# Patient Record
Sex: FEMALE | Race: WHITE | Employment: OTHER | URBAN - METROPOLITAN AREA
[De-identification: names, ages, dates, MRNs, and addresses within clinical notes are randomized per-mention and may not be internally consistent; named-entity substitution may affect disease eponyms.]

---

## 2017-11-16 ENCOUNTER — HOSPITAL ENCOUNTER (OUTPATIENT)
Dept: PHYSICAL THERAPY | Age: 47
Discharge: HOME | End: 2017-11-16
Payer: OTHER GOVERNMENT

## 2017-11-16 PROCEDURE — 97530 THERAPEUTIC ACTIVITIES: CPT

## 2017-11-16 PROCEDURE — 97162 PT EVAL MOD COMPLEX 30 MIN: CPT

## 2017-11-16 NOTE — PROGRESS NOTES
PF EVALUATION/TREATMENT NOTE     Patient Name: See Gomez  Date:2017  : 1970  [x]  Patient  Verified  Payor:  / Plan: [facility]  RETIREES AND DEPENDENTS / Product Type:  /    In time: 02:10 Out time:03:00  Total Treatment Time (min): 50  Visit #: 4 of     Treatment Area: [x] Pelvic Floor     [] Other:    SUBJECTIVE  Any medication changes, allergies to medications, adverse drug reactions, diagnosis change, or new procedure performed?: [x] No    [] Yes (see summary sheet for update)  SEE POC    Pain Level : 3/10  OBJECTIVE     Pelvic Floor Dysfunction Evaluation    Musculoskeletal Screen: N/A    Skin Integrity:  [x] Healthy [] Red  [] Labia Atrophy [] Fragile    Sensation: [x] Intact [] Diminished:    Muscle Bulk: [x] Symmetrical  [] Well-developed [] Atrophied:  []L   []R   []B    Prolapse: Uterine prolapse: grade II [] Cystocele:   [] Rectocele:     PERF Score (Performance/Endurance/Repetitions/Flicks)   P: 2 E:3 R: 3 F: -- Total: 8    Accessory Muscle Use: adductors    Patient has failed previous pelvic floor muscle training? [] Yes    [x] No    40 min []Eval                  []Re-Eval     10 min Therapeutic Activity:  []  See flow sheet :    []  Increase Tissue extensibility        []  Assess fiber intake    [x]  Assess voiding habits  []  Assess bowel habits  [x]  Other: reviewed bladder diary and proper way to fill out as well as assisted with assessment of functional limitations/quality of life with current symptoms using survey   Rationale: increase awareness  to improve the patients ability to have decreased leakage episodes    Other Objective/Functional Measures: FOTO: PFDI 13    Pain Level (0-10 scale) post treatment: 3/10    ASSESSMENT:   Patient is a 50year old female who presents with symptoms of pelvic/perineal pain and uterine prolapse. Patient reports pain began in  which exacerbated 2 months ago with sudden increase in physical activity.  Pain described as burning/aching sensation mostly on left side of pelvic floor muscles and reaching up towards back. Also, patient reports pain on coccyx. 3/10 pain today with worst 6/10 and least amount of 1/10. Laying flat on her stomach or side feels better. She does have a cushion to relieve pressure. Patient reports she does have urge incontinence in which she wears a pantyliner 2X/day and leakage episodes of 3X/week. Voiding frequency of 5X/day and no nocturia. Grade II with uterine prolapse. No pessary being used at this time. No reports of bowel issues. Examination showed patient had limited  PFM motility, PFM MMT of 2/5, poor PFM endurance and coordination, and increased pain left side>right with PFM especially nearing coccyx. Patient would benefit from skilled PT services to address deficits and improve patient's functional mobility.       [x]  See Plan of Care  []  See progress note/recertification  []  See Discharge Summary         Progress towards goals / Updated goals:  SEE POC    PLAN  []  Upgrade activities as tolerated     [x]  Continue plan of care  []  Update interventions per flow sheet       []  Discharge due to:_  []  Other:_      Fe Zambrano 11/16/2017  2:05 PM

## 2017-11-16 NOTE — PROGRESS NOTES
[facility] at [address] [place] [address]  Phone: [phone]   Fax: [phone]      Plan of Care/ Statement of Necessity for Physical Therapy Services    Patient name: Samantha Houston Start of Care: 2017   Referral source: Stephani Nicholson MD : 1970    Medical Diagnosis: Pelvic and perineal pain [R10.2]  Other muscle spasm [M62.838]   Onset Date:    Treatment Diagnosis: pelvic/perineal pain   Prior Hospitalization: see medical history Provider#: [id number]   Medications: Verified on Patient summary List    Comorbidities: fractures coccyx, SI joint pain, endometriosis, urge incontinence, hemorrhoidectomy 5 years ago, uterine prolapse   Prior Level of Function: patient was independent with ADLs and activities          The Plan of Care and following information is based on the information from the initial evaluation. Assessment/ key information:   Patient is a 50year old female who presents with symptoms of pelvic/perineal pain and uterine prolapse. Patient reports pain began in  which exacerbated 2 months ago with sudden increase in physical activity. Pain described as burning/aching sensation mostly on left side of pelvic floor muscles and reaching up towards back. Also, patient reports pain on coccyx. 3/10 pain today with worst 6/10 and least amount of 1/10. Laying flat on her stomach or side feels better. She does have a cushion to relieve pressure. Patient reports she does have urge incontinence in which she wears a pantyliner 2X/day and leakage episodes of 3X/week. Voiding frequency of 5X/day and no nocturia. Grade II with uterine prolapse. No pessary being used at this time. No reports of bowel issues. Examination showed patient had limited  PFM motility, PFM MMT of 2/5, poor PFM endurance and coordination, and increased pain left side>right with PFM especially nearing coccyx.  Patient would benefit from skilled PT services to address deficits and improve patient's functional mobility. Evaluation Complexity History MEDIUM  Complexity : 1-2 comorbidities / personal factors will impact the outcome/ POC ; Examination MEDIUM Complexity : 3 Standardized tests and measures addressing body structure, function, activity limitation and / or participation in recreation  ;Presentation MEDIUM Complexity : Evolving with changing characteristics  ; Clinical Decision Making MEDIUM Complexity : FOTO score of 26-74  Overall Complexity Rating: MEDIUM    Problem List: Pelvic pain/dysfunction, Decreased pelvic floor mm awareness, Decreased pelvic floor mm strength, Use of accessory muscles, Improper voiding habits, Hypertonus of pelvic floor and Urinary urgency    Treatment Plan may include any combination of the following:   Therapeutic exercise, Urge suppression techniques, Neuromuscular re-education, Manual therapy, Physical agent/modality and Patient educationSEMG/biofeedback, and electrical stimulation  Patient / Family readiness to learn indicated by: asking questions, trying to perform skills and interest    Persons(s) to be included in education: patient (P)    Barriers to Learning/Limitations: None    Patient Goal (s): lessens    Patient Self Reported Health Status: good    Rehabilitation Potential: good    Short Term Goals: To be accomplished in 4  weeks:   1. Pt will demonstrate home exercise program accurately/including isolated PFM contractions as adjunct to PT clinic visits to promote healthy lifestyle and increased quality of life. Status @ Eval: to be initiated when appropriate  2. Pt will have reduced symptoms of POP (perineal heaviness/pressure) by 25-50% by pt report for improved function and increased quality of life. Status @ Eval: 0%  3. Pt will report pain decreased by 50% or greater for increased quality of life. Status @ Eval: 0%  4.  Patient will complete bladder diary as educational tool to assess possible factors with urinary leakage and improve symptoms to decrease need for panty liners. Status @ Eval: provided    Long Term Goals: To be accomplished in 8  weeks:  1. Pt will demonstrate increased PFM motor performance by 1/2-1 grade for more normal function. Status @ Eval: 2/5 PFM MMT left side  2. Pt will report pain 0/10 for improved quality of life. Status @ Eval: 5/10 today, 6/10 at worst  3. Pt will demonstrate ability to manage changes in intrabdominal pressure with appropriate pelvic floor activation in order to decrease sensation of heaviness and improve function. Status @ Eval: poor demonstration noted with breathing  4. Pt will have FOTO score change of 5 points indicating improvement in function. Status @ Eval: 13 PFDI Pain  5. Patient will report decreased leakage episodes to 1X/week at most to be able to improve overall quality of life and decrease need for panty liners. Status @ Eval: 3X/week     Frequency / Duration: Patient to be seen 1-2 times per week for 8 weeks. Patient/ Caregiver education and instruction: Diagnosis, prognosis, Other bladder diary   [x]  Plan of care has been reviewed with MAR Lynch 11/16/2017 2:05 PM    ________________________________________________________________________    I certify that the above Therapy Services are being furnished while the patient is under my care. I agree with the treatment plan and certify that this therapy is necessary.     Physician's Signature:___________________  Date:____________Time: _________    Please sign and return to [facility] at [address] [place] [address]     Phone: [phone]   Fax: [phone]

## 2017-11-22 ENCOUNTER — HOSPITAL ENCOUNTER (OUTPATIENT)
Dept: PHYSICAL THERAPY | Age: 47
Discharge: HOME | End: 2017-11-22
Payer: OTHER GOVERNMENT

## 2017-11-22 PROCEDURE — 97110 THERAPEUTIC EXERCISES: CPT

## 2017-11-22 PROCEDURE — 97014 ELECTRIC STIMULATION THERAPY: CPT

## 2017-11-22 NOTE — PROGRESS NOTES
PF DAILY TREATMENT NOTE 3-16    Patient Name: Diedre Ganser  Date:2017  : 1970  [x]  Patient  Verified  Payor:  / Plan: [facility]  RETIREES AND DEPENDENTS / Product Type: [employer] /    In time:04:30  Out time:05:10  Total Treatment Time (min): 40  Visit #: 2 of -    Treatment Area: [x] Pelvic Floor     [] Other:    SUBJECTIVE  Pain Level (0-10 scale): 4/10  Any medication changes, allergies to medications, adverse drug reactions, diagnosis change, or new procedure performed?: [x] No    [] Yes (see summary sheet for update)  Subjective functional status/changes:   [] No changes reported  Patient brought in bladder log.      OBJECTIVE    Modality rationale: decrease pain and increase tissue extensibility to improve the patients ability to sit for prolonged periods of time   Min Type Additional Details   15 [x] Estim:  []Unatt       []IFC  []Premod                        []Other:  []w/ice   [x]w/heat  Position:  Location:    [] Estim: []Att    []TENS instruct  []NMES                    []Other:  []w/US   []w/ice   []w/heat  Position:  Location:    []  Ultrasound: []Continuous   [] Pulsed                           []1MHz   []3MHz Position:  Location:    []  Ice     []  heat  []  Ice massage  []  Laser   []  Anodyne Position:  Location:   [] Skin assessment post-treatment:  []intact []redness- no adverse reaction    []redness  adverse reaction:         25 min Therapeutic Exercise:  [] See flow sheet :   []  Pelvic floor strengthening                 []  Pelvic floor downtraining  []  Quality pelvic floor contractions       []  Relaxation techniques  []  Urge suppression exercises  []  Other:  Rationale: increase ROM, increase strength and improve coordination  to improve the patients ability to relax motility           With   [] TE   [] TA   [] neuro  [] manual   [] other: Patient Education: [x] Review HEP    [] Progressed/Changed HEP based on:   [] positioning   [] body mechanics   [] transfers   [] heat/ice application    [] other:      Other Objective/Functional Measures:   Refer to goals     Pain Level (0-10 scale) post treatment: 2/10    ASSESSMENT/Changes in Function:   Patient reported improved pain following estim trial for possible TENs unit at next visit. PT initiated HEP. []  Decrease # of leaks   [] No change []  Improving [] Resolved     []  Decrease hypertonus [] No change []  Improving [] Resolved     []  Increase void interval [] No change []  Improving [] Resolved     []  Increase PF strength [] No change []  Improving [] Resolved     []  Increase PF endurance [] No change []  Improving [] Resolved     []  Increase endurance [] No change []  Improving [] Resolved     []  Decrease # of pads [] No change []  Improving [] Resolved     []  Decrease pain [] No change []  Improving [] Resolved     []  Increased coordination [] No change []  Improving [] Resolved     []  Increased Bowel Frequency [] No change []  Improving [] Resolved       Patient will continue to benefit from skilled PT services to modify and progress therapeutic interventions, address functional mobility deficits, address ROM deficits, address strength deficits and analyze and address soft tissue restrictions to attain remaining goals. []  See Plan of Care  []  See progress note/recertification  []  See Discharge Summary         Progress towards goals / Updated goals:  Short Term Goals: To be accomplished in 4  weeks:   1. Pt will demonstrate home exercise program accurately/including isolated PFM contractions as adjunct to PT clinic visits to promote healthy lifestyle and increased quality of life. Status @ Eval: to be initiated when appropriate  Current: initiated HEP  2. Pt will have reduced symptoms of POP (perineal heaviness/pressure) by 25-50% by pt report for improved function and increased quality of life. Status @ Eval: 0%  3.  Pt will report pain decreased by 50% or greater for increased quality of life. Status @ Eval: 0%  4. Patient will complete bladder diary as educational tool to assess possible factors with urinary leakage and improve symptoms to decrease need for panty liners. Status @ Eval: provided  Current: reviewed-GOAL MET     Long Term Goals: To be accomplished in 8  weeks:  1. Pt will demonstrate increased PFM motor performance by 1/2-1 grade for more normal function. Status @ Eval: 2/5 PFM MMT left side  2. Pt will report pain 0/10 for improved quality of life. Status @ Eval: 5/10 today, 6/10 at worst  3. Pt will demonstrate ability to manage changes in intrabdominal pressure with appropriate pelvic floor activation in order to decrease sensation of heaviness and improve function. Status @ Eval: poor demonstration noted with breathing  4. Pt will have FOTO score change of 5 points indicating improvement in function. Status @ Eval: 13 PFDI Pain  5. Patient will report decreased leakage episodes to 1X/week at most to be able to improve overall quality of life and decrease need for panty liners.    Status @ Eval: 3X/week     PLAN  []  Upgrade activities as tolerated     [x]  Continue plan of care  []  Update interventions per flow sheet       []  Discharge due to:_  []  Other:_      Malu Karimi 11/22/2017  4:33 PM    Future Appointments  Date Time Provider Malini Milligan   [facility]   [facility]2017 [facility]   12/7/2017 10:00 AM Malu [facility]AE[facility]ING [facility]   12/11/2017 8:45 AM Malu BROWN [facility]   12/14/2017 10:30 AM [facility]   12/18/2017 8:15 AM [place] [facility]   12/21/2017 10:00 AM [place] [facility]   12/28/2017 10:00 AM [facility]

## 2017-11-27 ENCOUNTER — HOSPITAL ENCOUNTER (OUTPATIENT)
Dept: PHYSICAL THERAPY | Age: 47
Discharge: HOME | End: 2017-11-27
Payer: OTHER GOVERNMENT

## 2017-11-27 PROCEDURE — 97140 MANUAL THERAPY 1/> REGIONS: CPT

## 2017-11-27 PROCEDURE — 97014 ELECTRIC STIMULATION THERAPY: CPT

## 2017-11-27 NOTE — PROGRESS NOTES
PF DAILY TREATMENT NOTE 3-16    Patient Name: Yadiel Damico  Date:2017  : 1970  [x]  Patient  Verified  Payor:  / Plan: [facility]  RETIREES AND DEPENDENTS / Product Type:  /    In time: 09:19  Out time:09:55  Total Treatment Time (min): 36  Visit #: 3 of     Treatment Area: [x] Pelvic Floor     [] Other:    SUBJECTIVE  Pain Level (0-10 scale): 10  Any medication changes, allergies to medications, adverse drug reactions, diagnosis change, or new procedure performed?: [x] No    [] Yes (see summary sheet for update)  Subjective functional status/changes:   [] No changes reported  Patient reports she is unsure if the Estim did any difference. PT discussed use of estim with combination of heat to address pain and to perform manual externally. OBJECTIVE    Modality rationale: decrease pain and increase tissue extensibility to improve the patients ability to increase tissue release   Min Type Additional Details   15 [x] Estim:  []Unatt       [x]IFC  []Premod                        []Other:  []w/ice   [x]w/heat  Position: supine  Location: parasacral region    [] Estim: []Att    []TENS instruct  []NMES                    []Other:  []w/US   []w/ice   []w/heat  Position:  Location:    []  Ultrasound: []Continuous   [] Pulsed                           []1MHz   []3MHz Position:  Location:    []  Ice     []  heat  []  Ice massage  []  Laser   []  Anodyne Position:  Location:   [] Skin assessment post-treatment:  []intact []redness- no adverse reaction    []redness  adverse reaction:     21 min Manual Therapy:  MFR/trigger point release to bilateral piriformis and gluteal muscles    Rationale: decrease pain, increase ROM, increase tissue extensibility and decrease trigger points to tissue release     Other Objective/Functional Measures:     Pain Level (0-10 scale) post treatment: 2/10    ASSESSMENT/Changes in Function: Tissue release noted with manual treatment and decrease in pain.  PT discussed with patient about focus on manual treatment as she is still having increased tension and muscular involvement which could be limiting coccyx mobility. PT added piriformis stretch to HEP. []  Decrease # of leaks   [] No change []  Improving [] Resolved     [x]  Decrease hypertonus [] No change [x]  Improving [] Resolved     []  Increase void interval [] No change []  Improving [] Resolved     []  Increase PF strength [] No change []  Improving [] Resolved     []  Increase PF endurance [] No change []  Improving [] Resolved     []  Increase endurance [] No change []  Improving [] Resolved     []  Decrease # of pads [] No change []  Improving [] Resolved     []  Decrease pain [] No change []  Improving [] Resolved     []  Increased coordination [] No change []  Improving [] Resolved     []  Increased Bowel Frequency [] No change []  Improving [] Resolved       Patient will continue to benefit from skilled PT services to modify and progress therapeutic interventions, address functional mobility deficits, address ROM deficits, address strength deficits and analyze and address soft tissue restrictions to attain remaining goals. []  See Plan of Care  []  See progress note/recertification  []  See Discharge Summary         Progress towards goals / Updated goals:  Short Term Goals: To be accomplished in 4  weeks:   1. Pt will demonstrate home exercise program accurately/including isolated PFM contractions as adjunct to PT clinic visits to promote healthy lifestyle and increased quality of life. Status @ Eval: to be initiated when appropriate  Current: initiated HEP  2. Pt will have reduced symptoms of POP (perineal heaviness/pressure) by 25-50% by pt report for improved function and increased quality of life. Status @ Eval: 0%  Current: N/A   3. Pt will report pain decreased by 50% or greater for increased quality of life. Status @ Eval: 0%  Current: N/A  4.  Patient will complete bladder diary as educational tool to assess possible factors with urinary leakage and improve symptoms to decrease need for panty liners. Status @ Eval: provided  Current: reviewed-GOAL MET      Long Term Goals: To be accomplished in 8  weeks:  1. Pt will demonstrate increased PFM motor performance by 1/2-1 grade for more normal function. Status @ Eval: 2/5 PFM MMT left side  Current: assess next visit  2. Pt will report pain 0/10 for improved quality of life. Status @ Eval: 5/10 today, 6/10 at worst  Current: 4/10 today  3. Pt will demonstrate ability to manage changes in intrabdominal pressure with appropriate pelvic floor activation in order to decrease sensation of heaviness and improve function. Status @ Eval: poor demonstration noted with breathing   4. Pt will have FOTO score change of 5 points indicating improvement in function. Status @ Eval: 13 PFDI Pain  5. Patient will report decreased leakage episodes to 1X/week at most to be able to improve overall quality of life and decrease need for panty liners.    Status @ Eval: 3X/week   Current: N/A        PLAN  []  Upgrade activities as tolerated     [x]  Continue plan of care  []  Update interventions per flow sheet       []  Discharge due to:_  []  Other:_      Debbie Mcnamara 11/27/2017  9:20 AM    Future Appointments  Date Time Provider Malini Milligan   12/4/2017 9:00 AM [place] [facility]   [facility]2017 [facility]   12/11/2017 8:45 AM Debbie Mcnamara [facility]   12/14/2017 10:30 AM [place] [facility]   12/18/2017 8:15 AM [place] [facility]   12/21/2017 10:00 AM [place] [facility]   12/28/2017 10:00 AM [place] [facility]

## 2017-12-04 ENCOUNTER — HOSPITAL ENCOUNTER (OUTPATIENT)
Dept: PHYSICAL THERAPY | Age: 47
Discharge: HOME | End: 2017-12-04
Payer: OTHER GOVERNMENT

## 2017-12-04 PROCEDURE — 97140 MANUAL THERAPY 1/> REGIONS: CPT

## 2017-12-04 PROCEDURE — 97014 ELECTRIC STIMULATION THERAPY: CPT

## 2017-12-04 NOTE — PROGRESS NOTES
PF DAILY TREATMENT NOTE 3-16    Patient Name: Kathleen Laser  Date:2017  : 1970  [x]  Patient  Verified  Payor:  / Plan: [facility]  RETIREES AND DEPENDENTS / Product Type: Luis M Donis /    In time:09:10  Out time:09:45  Total Treatment Time (min): 35  Visit #: 4 of -    Treatment Area: [x] Pelvic Floor     [] Other:    SUBJECTIVE  Pain Level (0-10 scale): 5/10  Any medication changes, allergies to medications, adverse drug reactions, diagnosis change, or new procedure performed?: [x] No    [] Yes (see summary sheet for update)  Subjective functional status/changes:   [] No changes reported      OBJECTIVE    Modality rationale: decrease pain and increase tissue extensibility to improve the patients ability to relax PFM   Min Type Additional Details   15 [x] Estim:  [x]Unatt       []IFC  []Premod                        []Other:  []w/ice   [x]w/heat  Position: supine  Location:sacroccygeal    [] Estim: []Att    []TENS instruct  []NMES                    []Other:  []w/US   []w/ice   []w/heat  Position:  Location:    []  Ultrasound: []Continuous   [] Pulsed                           []1MHz   []3MHz Position:  Location:    []  Ice     []  heat  []  Ice massage  []  Laser   []  Anodyne Position:  Location:   [] Skin assessment post-treatment:  []intact []redness- no adverse reaction    []redness  adverse reaction:       20 min Manual Therapy:  MFR/trigger point release puborectalis and obturator internus   Rationale: decrease pain, increase ROM, increase tissue extensibility and decrease trigger points to motility of PFM    Other Objective/Functional Measures:   Refer to goals     Pain Level (0-10 scale) post treatment: 6/10    ASSESSMENT/Changes in Function:   Patient had noted trigger points located in puborectalis and obturator internus left>right side with referring pain into region patient reports pain has been this weekend.  PT will provide patient with TENs unit next visit as she reported relief with pad placement focusing more near posterior aspect of perineum region. [x]  Decrease # of leaks   [] No change [x]  Improving [] Resolved     [x]  Decrease hypertonus [] No change [x]  Improving [] Resolved     []  Increase void interval [] No change []  Improving [] Resolved     [x]  Increase PF strength [x] No change []  Improving [] Resolved     [x]  Increase PF endurance [x] No change []  Improving [] Resolved     []  Increase endurance [] No change []  Improving [] Resolved     []  Decrease # of pads [] No change []  Improving [] Resolved     [x]  Decrease pain [x] No change []  Improving [] Resolved     []  Increased coordination [] No change []  Improving [] Resolved     []  Increased Bowel Frequency [] No change []  Improving [] Resolved       Patient will continue to benefit from skilled PT services to modify and progress therapeutic interventions, address functional mobility deficits, address ROM deficits, address strength deficits and analyze and address soft tissue restrictions to attain remaining goals. []  See Plan of Care  []  See progress note/recertification  []  See Discharge Summary         Progress towards goals / Updated goals:  Short Term Goals: To be accomplished in 4  weeks:   1. Pt will demonstrate home exercise program accurately/including isolated PFM contractions as adjunct to PT clinic visits to promote healthy lifestyle and increased quality of life. Status @ Eval: to be initiated when appropriate  Current: initiated HEP provided urge suppression technique handout  2. Pt will have reduced symptoms of POP (perineal heaviness/pressure) by 25-50% by pt report for improved function and increased quality of life. Status @ Eval: 0%  Current: 60%-GOAL MET  3. Pt will report pain decreased by 50% or greater for increased quality of life. Status @ Eval: 0%  Current: no change  4.  Patient will complete bladder diary as educational tool to assess possible factors with urinary leakage and improve symptoms to decrease need for panty liners. Status @ Eval: provided  Current: reviewed-GOAL MET      Long Term Goals: To be accomplished in 8  weeks:  1. Pt will demonstrate increased PFM motor performance by 1/2-1 grade for more normal function. Status @ Eval: 2/5 PFM MMT left side  Current: assess next visit  2. Pt will report pain 0/10 for improved quality of life. Status @ Eval: 5/10 today, 6/10 at worst  Current: 4/10 today  3. Pt will demonstrate ability to manage changes in intrabdominal pressure with appropriate pelvic floor activation in order to decrease sensation of heaviness and improve function. Status @ Eval: poor demonstration noted with breathing   4. Pt will have FOTO score change of 5 points indicating improvement in function. Status @ Eval: 13 PFDI Pain  Current: 13 no change   5. Patient will report decreased leakage episodes to 1X/week at most to be able to improve overall quality of life and decrease need for panty liners.    Status @ Eval: 3X/week   Current: 1X/week-GOAL MET    PLAN  []  Upgrade activities as tolerated     [x]  Continue plan of care  []  Update interventions per flow sheet       []  Discharge due to:_  []  Other:_      Jacki Reed 12/4/2017  9:09 AM    Future Appointments  Date Time Provider Malini Milligan   12/7/2017 10:00 AM [place] [facility]   12/11/2017 8:45 AM Jacki Reed [facility]   12/14/2017 10:30 AM [place] [facility]   12/18/2017 8:15 AM [place] [facility]   12/21/2017 10:00 AM [place] [facility]   12/28/2017 10:00 AM [place] [facility]

## 2017-12-07 ENCOUNTER — HOSPITAL ENCOUNTER (OUTPATIENT)
Dept: PHYSICAL THERAPY | Age: 47
Discharge: HOME | End: 2017-12-07
Payer: OTHER GOVERNMENT

## 2017-12-07 PROCEDURE — 97140 MANUAL THERAPY 1/> REGIONS: CPT

## 2017-12-07 PROCEDURE — 97032 APPL MODALITY 1+ESTIM EA 15: CPT

## 2017-12-07 NOTE — PROGRESS NOTES
PF DAILY TREATMENT NOTE 3-16    Patient Name: Yadiel Damico  Date:2017  : 1970  [x]  Patient  Verified  Payor:  / Plan: [facility]  RETIREES AND DEPENDENTS / Product Type: [place] /    In time:10:05  Out time:10:40  Total Treatment Time (min): 35  Visit #: 5 of     Treatment Area: [x] Pelvic Floor     [] Other:    SUBJECTIVE  Pain Level (0-10 scale): 3/10  Any medication changes, allergies to medications, adverse drug reactions, diagnosis change, or new procedure performed?: [x] No    [] Yes (see summary sheet for update)  Subjective functional status/changes:   [] No changes reported  Patient reports noted increased pain in coccyx region with driving yesterday. OBJECTIVE    Modality rationale: decrease pain and increase tissue extensibility to improve the patients ability to motility of PFM   Min Type Additional Details    [] Estim:  []Unatt       []IFC  []Premod                        []Other:  []w/ice   []w/heat  Position:  Location:   15 [] Estim: []Att    [x]TENS instruct  []NMES                    []Other:  []w/US   []w/ice   [x]w/heat  Position: supine  Location: sacrococcygeal region    []  Ultrasound: []Continuous   [] Pulsed                           []1MHz   []3MHz Position:  Location:    []  Ice     []  heat  []  Ice massage  []  Laser   []  Anodyne Position:  Location:   [] Skin assessment post-treatment:  []intact []redness- no adverse reaction    []redness  adverse reaction:       20 min Manual Therapy:  MFR/trigger point to puborectalis, obturator internus, and iliococcygeus bilaterally   Rationale: decrease pain, increase ROM, increase tissue extensibility and decrease trigger points to increase PFM motility    Other Objective/Functional Measures:     Pain Level (0-10 scale) post treatment: 3/10    ASSESSMENT/Changes in Function:   Tissue release noted following manual treatment.  Patient able to demonstrate proper use of TENs unit provided to her on IFC setting to address pain and increase coccyx/PFM motility. []  Decrease # of leaks   [] No change []  Improving [] Resolved     [x]  Decrease hypertonus [] No change [x]  Improving [] Resolved     []  Increase void interval [] No change []  Improving [] Resolved     []  Increase PF strength [] No change []  Improving [] Resolved     []  Increase PF endurance [] No change []  Improving [] Resolved     []  Increase endurance [] No change []  Improving [] Resolved     []  Decrease # of pads [] No change []  Improving [] Resolved     [x]  Decrease pain [] No change [x]  Improving [] Resolved     []  Increased coordination [] No change []  Improving [] Resolved     []  Increased Bowel Frequency [] No change []  Improving [] Resolved       Patient will continue to benefit from skilled PT services to modify and progress therapeutic interventions, address functional mobility deficits, address ROM deficits, address strength deficits and analyze and address soft tissue restrictions to attain remaining goals. []  See Plan of Care  []  See progress note/recertification  []  See Discharge Summary         Progress towards goals / Updated goals:  Short Term Goals: To be accomplished in 4  weeks:   1. Pt will demonstrate home exercise program accurately/including isolated PFM contractions as adjunct to PT clinic visits to promote healthy lifestyle and increased quality of life. Status @ Eval: to be initiated when appropriate  Current: initiated HEP provided urge suppression technique handout  2. Pt will have reduced symptoms of POP (perineal heaviness/pressure) by 25-50% by pt report for improved function and increased quality of life. Status @ Eval: 0%  Current: 60%-GOAL MET  3. Pt will report pain decreased by 50% or greater for increased quality of life. Status @ Eval: 0%  Current: no change  4.  Patient will complete bladder diary as educational tool to assess possible factors with urinary leakage and improve symptoms to decrease need for panty liners. Status @ Eval: provided  Current: reviewed-GOAL MET      Long Term Goals: To be accomplished in 8  weeks:  1. Pt will demonstrate increased PFM motor performance by 1/2-1 grade for more normal function. Status @ Eval: 2/5 PFM MMT left side  Current: assess next visit  2. Pt will report pain 0/10 for improved quality of life. Status @ Eval: 5/10 today, 6/10 at worst  Current: 4/10 today  3. Pt will demonstrate ability to manage changes in intrabdominal pressure with appropriate pelvic floor activation in order to decrease sensation of heaviness and improve function. Status @ Eval: poor demonstration noted with breathing   4. Pt will have FOTO score change of 5 points indicating improvement in function. Status @ Eval: 13 PFDI Pain  Current: 13 no change   5. Patient will report decreased leakage episodes to 1X/week at most to be able to improve overall quality of life and decrease need for panty liners.    Status @ Eval: 3X/week   Current: 1X/week-GOAL MET       PLAN  []  Upgrade activities as tolerated     [x]  Continue plan of care  []  Update interventions per flow sheet       []  Discharge due to:_  []  Other:_      Laverne Cochran 12/7/2017  10:05 AM    Future Appointments  Date Time Provider Malini Milligan   12/11/2017 8:45 AM [place] [facility]   12/14/2017 10:30 AM [place] [facility]   12/18/2017 8:15 AM [place] [facility]   12/21/2017 10:00 AM [place] [facility]   [facility]

## 2017-12-11 ENCOUNTER — HOSPITAL ENCOUNTER (OUTPATIENT)
Dept: PHYSICAL THERAPY | Age: 47
Discharge: HOME | End: 2017-12-11
Payer: OTHER GOVERNMENT

## 2017-12-11 PROCEDURE — 97140 MANUAL THERAPY 1/> REGIONS: CPT

## 2017-12-11 NOTE — PROGRESS NOTES
PF DAILY TREATMENT NOTE 3-16    Patient Name: Nomi Franklin  Date:2017  : 1970  [x]  Patient  Verified  Payor:  / Plan: [facility]  RETIREES AND DEPENDENTS / Product Type: Christa Shames /    In time:08:45  Out time: 09:25  Total Treatment Time (min): 40  Visit #: 6 of -    Treatment Area: [x] Pelvic Floor     [] Other:    SUBJECTIVE  Pain Level (0-10 scale): 2-3/10  Any medication changes, allergies to medications, adverse drug reactions, diagnosis change, or new procedure performed?: [x] No    [] Yes (see summary sheet for update)  Subjective functional status/changes:   [] No changes reported      OBJECTIVE    Modality rationale: decrease pain and increase tissue extensibility to improve the patients ability to relaxation of PFM/coccyx mobility   Min Type Additional Details    [] Estim:  []Unatt       []IFC  []Premod                        []Other:  []w/ice   []w/heat  Position:  Location:    [] Estim: []Att    []TENS instruct  []NMES                    []Other:  []w/US   []w/ice   []w/heat  Position:  Location:    []  Ultrasound: []Continuous   [] Pulsed                           []1MHz   []3MHz Position:  Location:   10 []  Ice     []  heat  []  Ice massage  []  Laser   []  Anodyne Position:  Location:   [] Skin assessment post-treatment:  []intact []redness- no adverse reaction    []redness  adverse reaction:       40 min Manual Therapy:  MFR/trigger point release bilateral puborectalis, obturator internus, iliococcygeus, and coccygeus    Rationale: decrease pain, increase ROM, increase tissue extensibility, decrease edema  and decrease trigger points to increase PFM motility     With   [] TE   [] TA   [] neuro  [] manual   [] other: Patient Education: [x] Review HEP    [] Progressed/Changed HEP based on:   [] positioning   [] body mechanics   [] transfers   [] heat/ice application    [] other:      Other Objective/Functional Measures:       Pain Level (0-10 scale) post treatment: 1-2/10    ASSESSMENT/Changes in Function:   Patient reports relief and noted tissue release following manual treatment. Patient continues to have increased trigger areas on left side more than right.     []  Decrease # of leaks   [] No change []  Improving [] Resolved     [x]  Decrease hypertonus [] No change [x]  Improving [] Resolved     []  Increase void interval [] No change []  Improving [] Resolved     []  Increase PF strength [] No change []  Improving [] Resolved     []  Increase PF endurance [] No change []  Improving [] Resolved     []  Increase endurance [] No change []  Improving [] Resolved     []  Decrease # of pads [] No change []  Improving [] Resolved     [x]  Decrease pain [] No change [x]  Improving [] Resolved     []  Increased coordination [] No change []  Improving [] Resolved     []  Increased Bowel Frequency [] No change []  Improving [] Resolved       Patient will continue to benefit from skilled PT services to modify and progress therapeutic interventions, address functional mobility deficits, address ROM deficits, address strength deficits, analyze and address soft tissue restrictions and analyze and cue movement patterns to attain remaining goals. []  See Plan of Care  []  See progress note/recertification  []  See Discharge Summary         Progress towards goals / Updated goals:  Short Term Goals: To be accomplished in 4  weeks:   1. Pt will demonstrate home exercise program accurately/including isolated PFM contractions as adjunct to PT clinic visits to promote healthy lifestyle and increased quality of life. Status @ Eval: to be initiated when appropriate  Current: initiated HEP provided urge suppression technique handout  2. Pt will have reduced symptoms of POP (perineal heaviness/pressure) by 25-50% by pt report for improved function and increased quality of life. Status @ Eval: 0%  Current: 60%-GOAL MET  3.  Pt will report pain decreased by 50% or greater for increased quality of life. Status @ Eval: 0%  Current: no change  4. Patient will complete bladder diary as educational tool to assess possible factors with urinary leakage and improve symptoms to decrease need for panty liners. Status @ Eval: provided  Current: reviewed-GOAL MET      Long Term Goals: To be accomplished in 8  weeks:  1. Pt will demonstrate increased PFM motor performance by 1/2-1 grade for more normal function. Status @ Eval: 2/5 PFM MMT left side  Current: assess next visit  2. Pt will report pain 0/10 for improved quality of life. Status @ Eval: 5/10 today, 6/10 at worst  Current: 4/10 today  3. Pt will demonstrate ability to manage changes in intrabdominal pressure with appropriate pelvic floor activation in order to decrease sensation of heaviness and improve function. Status @ Eval: poor demonstration noted with breathing   4. Pt will have FOTO score change of 5 points indicating improvement in function. Status @ Eval: 13 PFDI Pain  Current: 13 no change   5. Patient will report decreased leakage episodes to 1X/week at most to be able to improve overall quality of life and decrease need for panty liners.    Status @ Eval: 3X/week   Current: 1X/week-GOAL MET    PLAN  []  Upgrade activities as tolerated     [x]  Continue plan of care  []  Update interventions per flow sheet       []  Discharge due to:_  []  Other:_      Denny Collazo 12/11/2017  8:24 AM    Future Appointments  Date Time Provider Malini Milligan   [facility]   [facility]   [facility]   [facility]   12/28/2017 10:00 AM [facility]

## 2017-12-14 ENCOUNTER — HOSPITAL ENCOUNTER (OUTPATIENT)
Dept: PHYSICAL THERAPY | Age: 47
Discharge: HOME | End: 2017-12-14
Payer: OTHER GOVERNMENT

## 2017-12-14 PROCEDURE — 97112 NEUROMUSCULAR REEDUCATION: CPT

## 2017-12-14 PROCEDURE — 97140 MANUAL THERAPY 1/> REGIONS: CPT

## 2017-12-14 NOTE — PROGRESS NOTES
[facility] at [facility], [place], operated by [facility] [address] [facility]  Phone: [phone]   Fax: [phone]    [facility] [place], [facility] [address] [facility]  Phone: [phone]   Fax: [phone]      Pelvic Floor Progress Note  Patient name: Christine Staley Start of Care: 2017   Referral source: Mima Hoffmann MD : 1970   Medical/Treatment Diagnosis: Pelvic and perineal pain [R10.2]  Other muscle spasm [M62.838] Onset Date:      Prior Hospitalization: see medical history Provider#: [id number]   Medications: Verified on Patient Summary List    Comorbidities: fractures coccyx, SI joint pain, endometriosis, urge incontinence, hemorrhoidectomy 5 years ago, uterine prolapse   Prior Level of Function: patient was independent with ADLs and activities       Visits from Start of Care: 7    Missed Visits: 0    Established Goals:           Excellent Good         Limited           None  [x] Increase Pelvic MM strength       []  []  [x]  []  [x] Decrease Pelvic MM hypertonus     []  [x]  []  []  [] Decrease Incontinence Episodes    []  []  []  []   [x] Improve Voiding Habits        []  [x]  []  []   [] Decreased Urgency        []  []  []  []  [] Decrease Pelvic Pain        []  []  []  []    Key Functional Changes: resolved leakage episodes    Short Term Goals: To be accomplished in 4  weeks:   1. Pt will demonstrate home exercise program accurately/including isolated PFM contractions as adjunct to PT clinic visits to promote healthy lifestyle and increased quality of life. Status @ Eval: to be initiated when appropriate  Current: patient compliant  2. Pt will have reduced symptoms of POP (perineal heaviness/pressure) by 25-50% by pt report for improved function and increased quality of life. Status @ Eval: 0%  Current: patient report much improved with only noticing heaviness one time in the past week however did not objectively report  3.  Pt will report pain decreased by 50% or greater for increased quality of life. Status @ Eval: 0%  Current: patient reports continued severe pain at times but only with being seated for certain periods of time-did not objectively report  4. Patient will complete bladder diary as educational tool to assess possible factors with urinary leakage and improve symptoms to decrease need for panty liners. Status @ Eval: provided  Current: GOAL MET      Long Term Goals: To be accomplished in 8  weeks:  1. Pt will demonstrate increased PFM motor performance by 1/2-1 grade for more normal function. Status @ Eval: 2/5 PFM MMT left side  Current: 2/5  2. Pt will report pain 0/10 for improved quality of life. Status @ Eval: 5/10 today, 6/10 at worst  Current: 3-4/10 today, however relief following treatment of 2/10 on average   3. Pt will demonstrate ability to manage changes in intrabdominal pressure with appropriate pelvic floor activation in order to decrease sensation of heaviness and improve function. Status @ Eval: poor demonstration noted with breathing  Current: patient still requires cuing on not holding her breathe at times  4. Pt will have FOTO score change of 5 points indicating improvement in function. Status @ Eval: 13 PFDI Pain  Current: 13 PFDI Pain at 4th visit, to be retested at 8th visit  5. Patient will report decreased leakage episodes to 1X/week at most to be able to improve overall quality of life and decrease need for panty liners. Status @ Eval: 3X/week   Current: resolved-GOAL MET     Updated Goals: to be achieved in 4 weeks:   Continue with unmet goals     ASSESSMENT/RECOMMENDATIONS:  Patient progressing towards goals set. Improvement in overall symptoms. Performed postural retraining to increase awareness of posture while seated in chair mimicking work conditions or car conditions.  Patient will continue to benefit from skilled PT services to modify and progress therapeutic interventions, address functional mobility deficits, address ROM deficits, address strength deficits, analyze and address soft tissue restrictions and analyze and cue movement patterns to attain remaining goals. [x]Continue therapy per initial plan/protocol at a frequency of  1-2 x per week for 4 weeks  []Continue therapy with the following recommended changes:_____________________      _____________________________________________________________________  []Discontinue therapy progressing towards or have reached established goals  []Discontinue therapy due to lack of appreciable progress towards goals  []Discontinue therapy due to lack of attendance or compliance  []Await Physician's recommendations/decisions regarding therapy  []Other:________________________________________________________________    Thank you for this referral.   Shun Altamirano 12/14/2017 10:15 AM  NOTE TO PHYSICIAN:  PLEASE COMPLETE THE ORDERS BELOW AND   FAX TO [facility] at [facility]    Fax: [phone]  If you are unable to process this request in 24 hours please contact our office:    [phone]      ? I have read the above report and request that my patient continue as recommended. ? I have read the above report and request that my patient continue therapy with the following changes/special instructions:___________________________________________________________  ? I have read the above report and request that my patient be discharged from therapy.     Physicians signature: _______________________________Date: _____Time:_____  Progress Report

## 2017-12-14 NOTE — PROGRESS NOTES
PF DAILY TREATMENT NOTE 3-    Patient Name: Mady Cabrera  Date:2017  : 1970  [x]  Patient  Verified  Payor:  / Plan: [facility]  RETIREES AND DEPENDENTS / Product Type: Eduardo Jeromy /    In time:10:35  Out time:11:15  Total Treatment Time (min): 40  Visit #: 7 of -16     Treatment Area: [x] Pelvic Floor     [] Other:    SUBJECTIVE  Pain Level (0-10 scale): 3-410  Any medication changes, allergies to medications, adverse drug reactions, diagnosis change, or new procedure performed?: [x] No    [] Yes (see summary sheet for update)  Subjective functional status/changes:   [] No changes reported  Patient reported increase noted pain with work when she has to sit for extended periods of time. Has a cushion but does not have hole for pressure relief of coccyx.      OBJECTIVE    Modality rationale: decrease pain and increase tissue extensibility to improve the patients ability to relax PFM motility   Min Type Additional Details    - Estim:  -Unatt       -IFC  -Premod                        -Other:  []w/ice   []w/heat  Position:  Location:    - Estim: -Att    -TENS instruct  -NMES                    -Other:  []w/US   []w/ice   []w/heat  Position:  Location:    -  Ultrasound: -Continuous   - Pulsed                           -1MHz   -3MHz Position:  Location:   10 -  Ice     X heat  -  Ice massage  -  Laser   -  Anodyne Position: supine  Location: sacrococcygeal    [] Skin assessment post-treatment:  []intact []redness- no adverse reaction    []redness  adverse reaction:     8 min Neuromuscular Re-education:  []  See flow sheet :   []  Pelvic floor strengthening                 []  Pelvic floor downtraining  []  Quality pelvic floor contractions       []  Relaxation techniques  []  Urge suppression exercises  [x]  Other: postural training with Oov to increase proper posture while seated in the chair  Rationale: increase ROM, increase strength, improve coordination and improve balance  to improve the patients ability to     22 min Manual Therapy:  MFR/trigger point release bilateral puborectalis, obturator internus, iliococcygeus, coccygeus muscles    Rationale: decrease pain, increase ROM, increase tissue extensibility and decrease trigger points to     Other Objective/Functional Measures:   Refer to goals     Pain Level (0-10 scale) post treatment: 2/10    ASSESSMENT/Changes in Function:   Patient progressing towards goals set. Improvement in overall symptoms. Performed postural retraining to increase awareness of posture while seated in chair mimicking work conditions or car conditions. Patient will continue to benefit from skilled PT services to modify and progress therapeutic interventions, address functional mobility deficits, address ROM deficits, address strength deficits, analyze and address soft tissue restrictions and analyze and cue movement patterns to attain remaining goals. [x]  Decrease # of leaks   [] No change []  Improving [x] Resolved     [x]  Decrease hypertonus [] No change [x]  Improving [] Resolved     []  Increase void interval [] No change []  Improving [] Resolved     [x]  Increase PF strength [x] No change []  Improving [] Resolved     [x]  Increase PF endurance [x] No change []  Improving [] Resolved     []  Increase endurance [] No change []  Improving [] Resolved     []  Decrease # of pads [] No change []  Improving [] Resolved     [x]  Decrease pain [] No change [x]  Improving [] Resolved     []  Increased coordination [] No change []  Improving [] Resolved     []  Increased Bowel Frequency [] No change []  Improving [] Resolved       []  See Plan of Care  [x]  See progress note/recertification  []  See Discharge Summary         Progress towards goals / Updated goals:  Short Term Goals: To be accomplished in 4  weeks:   1.  Pt will demonstrate home exercise program accurately/including isolated PFM contractions as adjunct to PT clinic visits to promote healthy lifestyle and increased quality of life. Status @ Eval: to be initiated when appropriate  Current: patient compliant  2. Pt will have reduced symptoms of POP (perineal heaviness/pressure) by 25-50% by pt report for improved function and increased quality of life. Status @ Eval: 0%  Current: patient report much improved with only noticing heaviness one time in the past week however did not objectively report  3. Pt will report pain decreased by 50% or greater for increased quality of life. Status @ Eval: 0%  Current: patient reports continued severe pain at times but only with being seated for certain periods of time-did not objectively report  4. Patient will complete bladder diary as educational tool to assess possible factors with urinary leakage and improve symptoms to decrease need for panty liners. Status @ Eval: provided  Current: GOAL MET     Long Term Goals: To be accomplished in 8  weeks:  1. Pt will demonstrate increased PFM motor performance by 1/2-1 grade for more normal function. Status @ Eval: 2/5 PFM MMT left side  Current: 2/5  2. Pt will report pain 0/10 for improved quality of life. Status @ Eval: 5/10 today, 6/10 at worst  Current: 3-4/10 today, however relief following treatment of 2/10 on average   3. Pt will demonstrate ability to manage changes in intrabdominal pressure with appropriate pelvic floor activation in order to decrease sensation of heaviness and improve function. Status @ Eval: poor demonstration noted with breathing  Current: patient still requires cuing on not holding her breathe at times  4. Pt will have FOTO score change of 5 points indicating improvement in function. Status @ Eval: 13 PFDI Pain  Current: 13 PFDI Pain at 4th visit, to be retested at 8th visit  5. Patient will report decreased leakage episodes to 1X/week at most to be able to improve overall quality of life and decrease need for panty liners.    Status @ Eval: 3X/week   Current: resolved-GOAL MET    PLAN  []  Upgrade activities as tolerated     [x]  Continue plan of care  []  Update interventions per flow sheet       []  Discharge due to:_  []  Other:_      Ida Meade 12/14/2017  10:13 AM    Future Appointments  Date Time Provider Malini Milligan   12/14/2017 10:30 AM Olvin [facility]   12/18/2017 8:15 AM Olvin [facility]   12/21/2017 10:00 AM Olvin [facility]   [facility]/28/2017 10[facility]

## 2017-12-18 ENCOUNTER — APPOINTMENT (OUTPATIENT)
Dept: PHYSICAL THERAPY | Age: 47
End: 2017-12-18
Payer: OTHER GOVERNMENT

## 2017-12-21 ENCOUNTER — HOSPITAL ENCOUNTER (OUTPATIENT)
Dept: PHYSICAL THERAPY | Age: 47
Discharge: HOME | End: 2017-12-21
Payer: OTHER GOVERNMENT

## 2017-12-21 PROCEDURE — 97140 MANUAL THERAPY 1/> REGIONS: CPT

## 2017-12-21 NOTE — PROGRESS NOTES
PF DAILY TREATMENT NOTE 3-16    Patient Name: Glory Way  Date:2017  : 1970  [x]  Patient  Verified  Payor:  / Plan: [facility]  RETIREES AND DEPENDENTS / Product Type: Fairy Cheryl /    In time:10:00  Out time:10:40  Total Treatment Time (min): 40  Visit #: 8 of 11-15    Treatment Area: [x] Pelvic Floor     [] Other:    SUBJECTIVE  Pain Level (0-10 scale): 4/10  Any medication changes, allergies to medications, adverse drug reactions, diagnosis change, or new procedure performed?: [x] No    [] Yes (see summary sheet for update)  Subjective functional status/changes:   [] No changes reported  Patient reports flare-up with stretching yesterday. OBJECTIVE    Modality rationale: decrease pain and increase tissue extensibility to improve the patients ability to relax PFM and increase motilty   Min Type Additional Details    [] Estim:  []Unatt       []IFC  []Premod                        []Other:  []w/ice   []w/heat  Position:  Location:    [] Estim: []Att    []TENS instruct  []NMES                    []Other:  []w/US   []w/ice   []w/heat  Position:  Location:    []  Ultrasound: []Continuous   [] Pulsed                           []1MHz   []3MHz Position:  Location:   10 []  Ice     [x]  heat  []  Ice massage  []  Laser   []  Anodyne Position: supine  Location: sacrococcygeal   [] Skin assessment post-treatment:  []intact []redness- no adverse reaction    []redness  adverse reaction:     30 min Manual Therapy:  MFR/trigger point release puborectalis, obturator internus,    Rationale: decrease pain, increase ROM, increase tissue extensibility and decrease trigger points to increase PFM motility and coccyx mobility    Other Objective/Functional Measures:   Refer to goals     Pain Level (0-10 scale) post treatment: 2/10    ASSESSMENT/Changes in Function:   Patient had noted tissue release with improved relaxation of muscles following PFM contraction.  PT added PFM contractions to HEP and discussed benefits of pelviwand for patient to acquire to start performing at home for MFR/trigger release. []  Decrease # of leaks   [] No change []  Improving [] Resolved     [x]  Decrease hypertonus [] No change [x]  Improving [] Resolved     []  Increase void interval [] No change []  Improving [] Resolved     [x]  Increase PF strength [] No change [x]  Improving [] Resolved     []  Increase PF endurance [] No change []  Improving [] Resolved     []  Increase endurance [] No change []  Improving [] Resolved     []  Decrease # of pads [] No change []  Improving [] Resolved     [x]  Decrease pain [] No change [x]  Improving [] Resolved     [x]  Increased coordination [] No change [x]  Improving [] Resolved     []  Increased Bowel Frequency [] No change []  Improving [] Resolved       Patient will continue to benefit from skilled PT services to modify and progress therapeutic interventions, address functional mobility deficits, address ROM deficits, address strength deficits, analyze and address soft tissue restrictions and analyze and cue movement patterns to attain remaining goals. []  See Plan of Care  []  See progress note/recertification  []  See Discharge Summary         Progress towards goals / Updated goals:  Short Term Goals: To be accomplished in 4  weeks:   1. Pt will demonstrate home exercise program accurately/including isolated PFM contractions as adjunct to PT clinic visits to promote healthy lifestyle and increased quality of life. Status @ Eval: to be initiated when appropriate  Current: patient compliant  2. Pt will have reduced symptoms of POP (perineal heaviness/pressure) by 25-50% by pt report for improved function and increased quality of life. Status @ Eval: 0%  Current: patient report much improved with only noticing heaviness one time in the past week however did not objectively report  3. Pt will report pain decreased by 50% or greater for increased quality of life.   Status @ Eval: 0%  Current: patient reports much improved with use of new cushion and lumbar roll when she is in seated positions for prolonged periods of time      Long Term Goals: To be accomplished in 8  weeks:  1. Pt will demonstrate increased PFM motor performance by 1/2-1 grade for more normal function. Status @ Eval: 2/5 PFM MMT left side  Current: 2+/5-GOAL MET  2. Pt will report pain 0/10 for improved quality of life. Status @ Eval: 5/10 today, 6/10 at worst  Current: 3-4/10 today, however relief following treatment of 2/10 on average   3. Pt will demonstrate ability to manage changes in intrabdominal pressure with appropriate pelvic floor activation in order to decrease sensation of heaviness and improve function. Status @ Eval: poor demonstration noted with breathing  Current: patient still requires cuing on not holding her breathe at times  4. Pt will have FOTO score change of 5 points indicating improvement in function.   Status @ Eval: 29  Current: 21, change of 8-GOAL MET    PLAN  []  Upgrade activities as tolerated     [x]  Continue plan of care  []  Update interventions per flow sheet       []  Discharge due to:_  []  Other:_      Loli Rivera 12/21/2017  10:05 AM    Future Appointments  Date Time Provider Malini Milligan   12/28/2017 10:00 AM Olvin [facility]   1/2/2018 1:15 PM [place] [facility]   1/4/2018 10:45 AM Loli [facility]   1/8/2018 9:15 AM Loli Rivera [facility]   1/11/2018 10:00 AM Loli Alter [facility]   1/15/2018 8:45 AM Loli Rivera [facility]   1/18/2018 10:00 AM Loli Rivera [facility]   1/22/2018 9:15 AM Loli Rivera [facility]   1/25/2018 10:45 AM Loli [facility]

## 2017-12-28 ENCOUNTER — HOSPITAL ENCOUNTER (OUTPATIENT)
Dept: PHYSICAL THERAPY | Age: 47
Discharge: HOME | End: 2017-12-28
Payer: OTHER GOVERNMENT

## 2017-12-28 PROCEDURE — 97140 MANUAL THERAPY 1/> REGIONS: CPT

## 2017-12-28 NOTE — PROGRESS NOTES
PF DAILY TREATMENT NOTE 3-16    Patient Name: Maribel Mata  Date:2017  : 1970  [x]  Patient  Verified  Payor:  / Plan: [facility]  RETIREES AND DEPENDENTS / Product Type:  /    In time: 10:05  Out time:10:40  Total Treatment Time (min): 35  Visit #: 9 of 11-15    Treatment Area: [x] Pelvic Floor     [] Other:    SUBJECTIVE  Pain Level (0-10 scale): 3/10  Any medication changes, allergies to medications, adverse drug reactions, diagnosis change, or new procedure performed?: [x] No    [] Yes (see summary sheet for update)  Subjective functional status/changes:   [] No changes reported      OBJECTIVE    Modality rationale: decrease pain and increase tissue extensibility to improve the patients ability to relax PFM and coccyx mobility   Min Type Additional Details    [] Estim:  []Unatt       []IFC  []Premod                        []Other:  []w/ice   []w/heat  Position:  Location:    [] Estim: []Att    []TENS instruct  []NMES                    []Other:  []w/US   []w/ice   []w/heat  Position:  Location:    []  Ultrasound: []Continuous   [] Pulsed                           []1MHz   []3MHz Position:  Location:   10 []  Ice     [x]  heat  []  Ice massage  []  Laser   []  Anodyne Position: supine   Location:sacrococcygeal    [] Skin assessment post-treatment:  []intact []redness- no adverse reaction    []redness  adverse reaction:     25 min Manual Therapy:  MFR/trigger point release bilateral puborectalis and obturator internus, iliococcygeus, and coccygeus   Rationale: decrease pain, increase ROM, increase tissue extensibility and decrease trigger points to maneuver    Other Objective/Functional Measures:     Pain Level (0-10 scale) post treatment: 1/10    ASSESSMENT/Changes in Function:   Patient had good tissue release following manual treatment. Trigger areas mostly located posteriorly towards coccyx. PT to focus on area and have patient continue use of pelviwand every other day. []  Decrease # of leaks   [] No change []  Improving [] Resolved     [x]  Decrease hypertonus [] No change [x]  Improving [] Resolved     []  Increase void interval [] No change []  Improving [] Resolved     []  Increase PF strength [] No change []  Improving [] Resolved     []  Increase PF endurance [] No change []  Improving [] Resolved     []  Increase endurance [] No change []  Improving [] Resolved     []  Decrease # of pads [] No change []  Improving [] Resolved     [x]  Decrease pain [] No change [x]  Improving [] Resolved     []  Increased coordination [] No change []  Improving [] Resolved     []  Increased Bowel Frequency [] No change []  Improving [] Resolved       Patient will continue to benefit from skilled PT services to modify and progress therapeutic interventions, address functional mobility deficits, address ROM deficits, address strength deficits, analyze and address soft tissue restrictions and analyze and cue movement patterns to attain remaining goals. []  See Plan of Care  []  See progress note/recertification  []  See Discharge Summary         Progress towards goals / Updated goals:  Short Term Goals: To be accomplished in 4  weeks:   1. Pt will demonstrate home exercise program accurately/including isolated PFM contractions as adjunct to PT clinic visits to promote healthy lifestyle and increased quality of life. Status @ Eval: to be initiated when appropriate  Current: patient compliant  2. Pt will have reduced symptoms of POP (perineal heaviness/pressure) by 25-50% by pt report for improved function and increased quality of life. Status @ Eval: 0%  Current: patient report much improved with only noticing heaviness one time in the past week however did not objectively report  3. Pt will report pain decreased by 50% or greater for increased quality of life.   Status @ Eval: 0%  Current: patient reports much improved with use of new cushion and lumbar roll when she is in seated positions for prolonged periods of time      Long Term Goals: To be accomplished in 8  weeks:  1. Pt will demonstrate increased PFM motor performance by 1/2-1 grade for more normal function. Status @ Eval: 2/5 PFM MMT left side  Current: 2+/5-GOAL MET  2. Pt will report pain 0/10 for improved quality of life. Status @ Eval: 5/10 today, 6/10 at worst  Current: 3-4/10 today, however relief following treatment of 2/10 on average   3. Pt will demonstrate ability to manage changes in intrabdominal pressure with appropriate pelvic floor activation in order to decrease sensation of heaviness and improve function. Status @ Eval: poor demonstration noted with breathing  Current: patient still requires cuing on not holding her breathe at times  4. Pt will have FOTO score change of 5 points indicating improvement in function.   Status @ Eval: 29  Current: 21, change of 8-GOAL MET    PLAN  []  Upgrade activities as tolerated     [x]  Continue plan of care  []  Update interventions per flow sheet       []  Discharge due to:_  []  Other:_      Watt Kehr 12/28/2017  10:08 AM    Future Appointments  Date Time Provider Malini Milligan   1/2/2018 1:15 PM Olvin [facility]   1/4/2018 10:45 AM Olvin [facility]   1/8/2018 9:15 AM Watt Kehr [facility]   1/11/2018 10:00 AM Watt Kehr [facility]   1/15/2018 8:45 AM Watt Kehr [facility]   1/18/2018 10:00 AM Watt Kehr [facility]   1/22/2018 9:15 AM Olvin [facility]   1/25/2018 10:45 AM Olvin [facility]

## 2018-01-02 ENCOUNTER — HOSPITAL ENCOUNTER (OUTPATIENT)
Dept: PHYSICAL THERAPY | Age: 48
Discharge: HOME | End: 2018-01-02
Payer: OTHER GOVERNMENT

## 2018-01-02 PROCEDURE — 97140 MANUAL THERAPY 1/> REGIONS: CPT

## 2018-01-02 NOTE — PROGRESS NOTES
PF DAILY TREATMENT NOTE 3-16    Patient Name: Sam Rashid  Date:2018  : 1970  [x]  Patient  Verified  Payor: [facility] / Plan: [facility]  RETIREES AND DEPENDENTS / Product Type: [employer] /    In time:1:20  Out time:1:55  Total Treatment Time (min): 35  Visit #: 8 of 16    Treatment Area: [x] Pelvic Floor     [] Other:    SUBJECTIVE  Pain Level (0-10 scale): 0/10  Any medication changes, allergies to medications, adverse drug reactions, diagnosis change, or new procedure performed?: [x] No    [] Yes (see summary sheet for update)  Subjective functional status/changes:   [] No changes reported    OBJECTIVE  Modality rationale: decrease pain and increase tissue extensibility to improve the patients ability to relax PFM and coccyx mobility   Min Type Additional Details     [] Estim:  []Unatt       []IFC  []Premod                        []Other:  []w/ice   []w/heat  Position:  Location:     [] Estim: []Att    []TENS instruct  []NMES                    []Other:  []w/US   []w/ice   []w/heat  Position:  Location:     []  Ultrasound: []Continuous   [] Pulsed                           []1MHz   []3MHz Position:  Location:   10 []  Ice     [x]  heat  []  Ice massage  []  Laser   []  Anodyne Position: supine   Location:sacrococcygeal    [] Skin assessment post-treatment:  []intact []redness- no adverse reaction    []redness  adverse reaction:      25 min Manual Therapy:  MFR/trigger point release bilateral puborectalis and obturator internus, iliococcygeus, and coccygeus   Rationale: decrease pain, increase ROM, increase tissue extensibility and decrease trigger points to maneuver    Other Objective/Functional Measures:     Pain Level (0-10 scale) post treatment: 210    ASSESSMENT/Changes in Function:   Patient had good tissue release following manual treatment. Trigger areas mostly located posteriorly towards coccyx. PT discussed plans to perform coccyx mobility via rectally to assess and to focus on posterior PFM.    []  Decrease # of leaks   [] No change []  Improving [] Resolved     [x]  Decrease hypertonus [] No change [x]  Improving [] Resolved     []  Increase void interval [] No change []  Improving [] Resolved     []  Increase PF strength [] No change []  Improving [] Resolved     []  Increase PF endurance [] No change []  Improving [] Resolved     []  Increase endurance [] No change []  Improving [] Resolved     []  Decrease # of pads [] No change []  Improving [] Resolved     [x]  Decrease pain [] No change [x]  Improving [] Resolved     []  Increased coordination [] No change []  Improving [] Resolved     []  Increased Bowel Frequency [] No change []  Improving [] Resolved       Patient will continue to benefit from skilled PT services to modify and progress therapeutic interventions, address functional mobility deficits, address ROM deficits, address strength deficits, analyze and address soft tissue restrictions and analyze and cue movement patterns to attain remaining goals. []  See Plan of Care  []  See progress note/recertification  []  See Discharge Summary         Progress towards goals / Updated goals:  Short Term Goals: To be accomplished in 4  weeks:   1. Pt will demonstrate home exercise program accurately/including isolated PFM contractions as adjunct to PT clinic visits to promote healthy lifestyle and increased quality of life. Status @ Eval: to be initiated when appropriate  Current: patient compliant  2. Pt will have reduced symptoms of POP (perineal heaviness/pressure) by 25-50% by pt report for improved function and increased quality of life. Status @ Eval: 0%  Current: patient report much improved with only noticing heaviness one time in the past week however did not objectively report  3. Pt will report pain decreased by 50% or greater for increased quality of life.   Status @ Eval: 0%  Current: patient reports much improved with use of new cushion and lumbar roll when she is in seated positions for prolonged periods of time      Long Term Goals: To be accomplished in 8  weeks:  1. Pt will demonstrate increased PFM motor performance by 1/2-1 grade for more normal function. Status @ Eval: 2/5 PFM MMT left side  Current: 2+/5-GOAL MET  2. Pt will report pain 0/10 for improved quality of life. Status @ Eval: 5/10 today, 6/10 at worst  Current: 3-4/10 today, however relief following treatment of 2/10 on average   3. Pt will demonstrate ability to manage changes in intrabdominal pressure with appropriate pelvic floor activation in order to decrease sensation of heaviness and improve function. Status @ Eval: poor demonstration noted with breathing  Current: patient still requires cuing on not holding her breathe at times  4. Pt will have FOTO score change of 5 points indicating improvement in function.   Status @ Eval: 29  Current: 21, change of 8-GOAL MET    PLAN  []  Upgrade activities as tolerated     [x]  Continue plan of care  []  Update interventions per flow sheet       []  Discharge due to:_  []  Other:_      Charan Pump 1/2/2018  1:30 PM    Future Appointments  Date Time Provider Malini Milligan   1/4/2018 10:45 AM [place] Jesus [facility]   1/8/2018 9:15 AM [place] Jesus [facility]   1/11/2018 10:00 AM Kendrick Jesus [facility]   1/15/2018 8:45 AM Charan Pump [facility]   1/18/2018 10:00 AM Charan Pump [facility]   1/22/2018 9:15 AM Charan Pump [facility]   1/25/2018 10:45 AM Charan Pump [facility]

## 2018-01-04 ENCOUNTER — APPOINTMENT (OUTPATIENT)
Dept: PHYSICAL THERAPY | Age: 48
End: 2018-01-04
Payer: OTHER GOVERNMENT

## 2018-01-08 ENCOUNTER — HOSPITAL ENCOUNTER (OUTPATIENT)
Dept: PHYSICAL THERAPY | Age: 48
Discharge: HOME | End: 2018-01-08
Payer: OTHER GOVERNMENT

## 2018-01-08 PROCEDURE — 97140 MANUAL THERAPY 1/> REGIONS: CPT

## 2018-01-08 NOTE — PROGRESS NOTES
PF DAILY TREATMENT NOTE 3-16    Patient Name: Brandon Gibbs  Date:2018  : 1970  [x]  Patient  Verified  Payor:  / Plan: [facility]  RETIREES AND DEPENDENTS / Product Type: Reena Mcmillan /    In time:01:15  Out time:01:55  Total Treatment Time (min): 40  Visit #: 11 of -15    Treatment Area: [x] Pelvic Floor     [] Other:    SUBJECTIVE  Pain Level (0-10 scale): 210  Any medication changes, allergies to medications, adverse drug reactions, diagnosis change, or new procedure performed?: [x] No    [] Yes (see summary sheet for update)  Subjective functional status/changes:   [x] No changes reported      OBJECTIVE        Modality rationale: decrease pain and increase tissue extensibility to improve the patients ability to relax PFM and coccyx mobility   Min Type Additional Details      [] Estim:  []Unatt       []IFC  []Premod                        []Other:  []w/ice   []w/heat  Position:  Location:      [] Estim: []Att    []TENS instruct  []NMES                    []Other:  []w/US   []w/ice   []w/heat  Position:  Location:      []  Ultrasound: []Continuous   [] Pulsed                           []1MHz   []3MHz Position:  Location:   10 []  Ice     [x]  heat  []  Ice massage  []  Laser   []  Anodyne Position: supine   Location:sacrococcygeal    [] Skin assessment post-treatment:  []intact []redness- no adverse reaction    []redness  adverse reaction:       30 min Manual Therapy:  MFR/trigger point release bilateral coccygeus and mobilization of coccyx via rectally   Rationale: decrease pain, increase ROM, increase tissue extensibility and decrease trigger points to maneuver      Other Objective/Functional Measures:       Pain Level (0-10 scale) post treatment: 2/10    ASSESSMENT/Changes in Function:   Patient noted to have hypomobility with coccyx extension however full flexion. Increased noted pain with extension which patient reports is congruent with pain she has daily.  Release noted bilateral coccygeus muscle. []  Decrease # of leaks   [] No change []  Improving [] Resolved     [x]  Decrease hypertonus [] No change [x]  Improving [] Resolved     []  Increase void interval [] No change []  Improving [] Resolved     []  Increase PF strength [] No change []  Improving [] Resolved     []  Increase PF endurance [] No change []  Improving [] Resolved     []  Increase endurance [] No change []  Improving [] Resolved     []  Decrease # of pads [] No change []  Improving [] Resolved     [x]  Decrease pain [] No change [x]  Improving [] Resolved     []  Increased coordination [] No change []  Improving [] Resolved     []  Increased Bowel Frequency [] No change []  Improving [] Resolved       Patient will continue to benefit from skilled PT services to modify and progress therapeutic interventions, address functional mobility deficits, address ROM deficits, address strength deficits and analyze and address soft tissue restrictions to attain remaining goals. []  See Plan of Care  []  See progress note/recertification  []  See Discharge Summary         Progress towards goals / Updated goals:  Short Term Goals: To be accomplished in 4  weeks:   1. Pt will demonstrate home exercise program accurately/including isolated PFM contractions as adjunct to PT clinic visits to promote healthy lifestyle and increased quality of life. Status @ Eval: to be initiated when appropriate  Current: patient compliant  2. Pt will have reduced symptoms of POP (perineal heaviness/pressure) by 25-50% by pt report for improved function and increased quality of life. Status @ Eval: 0%  Current: patient report much improved with only noticing heaviness one time in the past week however did not objectively report  3. Pt will report pain decreased by 50% or greater for increased quality of life.   Status @ Eval: 0%  Current: patient reports much improved with use of new cushion and lumbar roll when she is in seated positions for prolonged periods of time      Long Term Goals: To be accomplished in 8  weeks:  1. Pt will demonstrate increased PFM motor performance by 1/2-1 grade for more normal function. Status @ Eval: 2/5 PFM MMT left side  Current: 2+/5-GOAL MET  2. Pt will report pain 0/10 for improved quality of life. Status @ Eval: 5/10 today, 6/10 at worst  Current: 3-4/10 today, however relief following treatment of 2/10 on average   3. Pt will demonstrate ability to manage changes in intrabdominal pressure with appropriate pelvic floor activation in order to decrease sensation of heaviness and improve function. Status @ Eval: poor demonstration noted with breathing  Current: patient still requires cuing on not holding her breathe at times  4. Pt will have FOTO score change of 5 points indicating improvement in function.   Status @ Eval: 29  Current: 21, change of 8-GOAL MET       PLAN  []  Upgrade activities as tolerated     [x]  Continue plan of care  []  Update interventions per flow sheet       []  Discharge due to:_  []  Other:_      Ping Cardona 1/8/2018  9:18 AM    Future Appointments  Date Time Provider Malini Milligan   1/11/2018 10:00 AM [facility]   1/15/2018 8:45 AM [facility]   1/18/2018 10:00 AM [facility]   1/22/2018 9:15 AM Ping BROWN [facility]   1/25/2018 10:45 AM Ping BROWN [facility]

## 2018-01-11 ENCOUNTER — HOSPITAL ENCOUNTER (OUTPATIENT)
Dept: PHYSICAL THERAPY | Age: 48
Discharge: HOME | End: 2018-01-11
Payer: OTHER GOVERNMENT

## 2018-01-11 PROCEDURE — 97140 MANUAL THERAPY 1/> REGIONS: CPT

## 2018-01-11 NOTE — PROGRESS NOTES
PF DAILY TREATMENT NOTE 3-16    Patient Name: Mila Anders  Date:2018  : 1970  [x]  Patient  Verified  Payor:  / Plan: [facility]  RETIREES AND DEPENDENTS / Product Type: Markie Coast /    In time:10:50  Out time:11:30  Total Treatment Time (min): 40  Visit #: 12 of -15    Treatment Area: [x] Pelvic Floor     [] Other:    SUBJECTIVE  Pain Level (0-10 scale): 0/10  Any medication changes, allergies to medications, adverse drug reactions, diagnosis change, or new procedure performed?: [x] No    [] Yes (see summary sheet for update)  Subjective functional status/changes:   [] No changes reported  Patient reports tolerance with rectal manual treatment last visit. OBJECTIVE          Modality rationale: decrease pain and increase tissue extensibility to improve the patients ability to relax PFM and coccyx mobility   Min Type Additional Details      [] Estim:  []Unatt       []IFC  []Premod                        []Other:  []w/ice   []w/heat  Position:  Location:      [] Estim: []Att    []TENS instruct  []NMES                    []Other:  []w/US   []w/ice   []w/heat  Position:  Location:      []  Ultrasound: []Continuous   [] Pulsed                           []1MHz   []3MHz Position:  Location:   10 []  Ice     [x]  heat  []  Ice massage  []  Laser   []  Anodyne Position: supine   Location:sacrococcygeal    [] Skin assessment post-treatment:  []intact []redness- no adverse reaction    []redness  adverse reaction:       30 min Manual Therapy:  MFR/trigger point release bilateral coccygeus and mobilization of coccyx via rectally   Rationale: decrease pain, increase ROM, increase tissue extensibility and decrease trigger points to maneuver    Other Objective/Functional Measures: FOTO: 21    Pain Level (0-10 scale) post treatment: 0/10    ASSESSMENT/Changes in Function: Patient progressing towards goals set with noted improvement in FOTO score.  PT recommends continuation of manual treatment rectally to better palpate coccyx for mobilization and PFM. Improved release of coccygeus and increased mobility of coccyx noted. []  Decrease # of leaks   [] No change []  Improving [] Resolved     [x]  Decrease hypertonus [] No change [x]  Improving [] Resolved     []  Increase void interval [] No change []  Improving [] Resolved     []  Increase PF strength [] No change []  Improving [] Resolved     []  Increase PF endurance [] No change []  Improving [] Resolved     []  Increase endurance [] No change []  Improving [] Resolved     []  Decrease # of pads [] No change []  Improving [] Resolved     [x]  Decrease pain [] No change [x]  Improving [] Resolved     []  Increased coordination [] No change []  Improving [] Resolved     []  Increased Bowel Frequency [] No change []  Improving [] Resolved       Patient will continue to benefit from skilled PT services to modify and progress therapeutic interventions, address functional mobility deficits, address ROM deficits, address strength deficits, analyze and address soft tissue restrictions and analyze and cue movement patterns to attain remaining goals. []  See Plan of Care  []  See progress note/recertification  []  See Discharge Summary         Progress towards goals / Updated goals:  Short Term Goals: To be accomplished in 4  weeks:   1. Pt will demonstrate home exercise program accurately/including isolated PFM contractions as adjunct to PT clinic visits to promote healthy lifestyle and increased quality of life. Status @ Eval: to be initiated when appropriate  Current: patient compliant  2. Pt will have reduced symptoms of POP (perineal heaviness/pressure) by 25-50% by pt report for improved function and increased quality of life. Status @ Eval: 0%  Current: patient report much improved with only noticing heaviness one time in the past week however did not objectively report  3.  Pt will report pain decreased by 50% or greater for increased quality of life.  Status @ Eval: 0%  Current: patient reports much improved with use of new cushion and lumbar roll when she is in seated positions for prolonged periods of time      Long Term Goals: To be accomplished in 8  weeks:  1. Pt will demonstrate increased PFM motor performance by 1/2-1 grade for more normal function. Status @ Eval: 2/5 PFM MMT left side  Current: 2+/5-GOAL MET  2. Pt will report pain 0/10 for improved quality of life. Status @ Eval: 5/10 today, 6/10 at worst  Current: 3-4/10 today, however relief following treatment of 2/10 on average   3. Pt will demonstrate ability to manage changes in intrabdominal pressure with appropriate pelvic floor activation in order to decrease sensation of heaviness and improve function. Status @ Eval: poor demonstration noted with breathing  Current: patient still requires cuing on not holding her breathe at times  4. Pt will have FOTO score change of 5 points indicating improvement in function.   Status @ Eval: 29  Status @ Last PN: 21, change of 8-GOAL MET  Current: 21-GOAL MET       PLAN  []  Upgrade activities as tolerated     [x]  Continue plan of care  []  Update interventions per flow sheet       []  Discharge due to:_  []  Other:_      De Mcburney 1/11/2018  11:30 AM    Future Appointments  Date Time Provider Malini Milligan   1/15/2018 8:45 AM Olvin [facility]   1/18/2018 10:00 AM Ol[facility]   1/22/2018 9:15 AM Olvin [facility]   [facility]

## 2018-01-15 ENCOUNTER — HOSPITAL ENCOUNTER (OUTPATIENT)
Dept: PHYSICAL THERAPY | Age: 48
Discharge: HOME | End: 2018-01-15
Payer: OTHER GOVERNMENT

## 2018-01-15 PROCEDURE — 97140 MANUAL THERAPY 1/> REGIONS: CPT

## 2018-01-15 NOTE — PROGRESS NOTES
PF DAILY TREATMENT NOTE 3-    Patient Name: Han Bhakta  Date:1/15/2018  : 1970  [x]  Patient  Verified  Payor:  / Plan: [facility]  RETIREES AND DEPENDENTS / Product Type: Cole jeane /    In time:08:45  Out time:09:25  Total Treatment Time (min): 40  Visit #: 13 of -15    Treatment Area: [x] Pelvic Floor     [] Other:    SUBJECTIVE  Pain Level (0-10 scale): 2/10  Any medication changes, allergies to medications, adverse drug reactions, diagnosis change, or new procedure performed?: [x] No    [] Yes (see summary sheet for update)  Subjective functional status/changes:   [] No changes reported  Patient reports no vaginal pain only located in sacral/coccyx region.  She did a lot of sitting over the past two days secondary to work     OBJECTIVE    Modality rationale: decrease pain and increase tissue extensibility to improve the patients ability to relax PFM/coccyx mobility    Min Type Additional Details    [] Estim:  []Unatt       []IFC  []Premod                        []Other:  []w/ice   []w/heat  Position:  Location:    [] Estim: []Att    []TENS instruct  []NMES                    []Other:  []w/US   []w/ice   []w/heat  Position:  Location:    []  Ultrasound: []Continuous   [] Pulsed                           []1MHz   []3MHz Position:  Location:   10 []  Ice     [x]  heat  []  Ice massage  []  Laser   []  Anodyne Position:supine   Location:sacrococcygeal   [] Skin assessment post-treatment:  []intact []redness- no adverse reaction    []redness  adverse reaction:     30 min Manual Therapy:  MFR/trigger point release with sweeping of PFM rectally and coccyx mobilization   Rationale: decrease pain, increase ROM, increase tissue extensibility and decrease trigger points to increase motility of PFM     Other Objective/Functional Measures:     Pain Level (0-10 scale) post treatment: 2/10    ASSESSMENT/Changes in Function:   Tissue release noted bilateral with continued increased trigger areas of left PFM>right.     []  Decrease # of leaks   [] No change []  Improving [] Resolved     [x]  Decrease hypertonus [] No change [x]  Improving [] Resolved     []  Increase void interval [] No change []  Improving [] Resolved     []  Increase PF strength [] No change []  Improving [] Resolved     []  Increase PF endurance [] No change []  Improving [] Resolved     []  Increase endurance [] No change []  Improving [] Resolved     []  Decrease # of pads [] No change []  Improving [] Resolved     [x]  Decrease pain [] No change [x]  Improving [] Resolved     []  Increased coordination [] No change []  Improving [] Resolved     []  Increased Bowel Frequency [] No change []  Improving [] Resolved       Patient will continue to benefit from skilled PT services to modify and progress therapeutic interventions, address functional mobility deficits, address ROM deficits, address strength deficits and analyze and address soft tissue restrictions to attain remaining goals. []  See Plan of Care  []  See progress note/recertification  []  See Discharge Summary         Progress towards goals / Updated goals:  Short Term Goals: To be accomplished in 4  weeks:   1. Pt will demonstrate home exercise program accurately/including isolated PFM contractions as adjunct to PT clinic visits to promote healthy lifestyle and increased quality of life. Status @ Eval: to be initiated when appropriate  Current: patient compliant  2. Pt will have reduced symptoms of POP (perineal heaviness/pressure) by 25-50% by pt report for improved function and increased quality of life. Status @ Eval: 0%  Current: 100% improvement with heaviness-GOAL MET  3. Pt will report pain decreased by 50% or greater for increased quality of life. Status @ Eval: 0%  Current: 2-3/10 on average with pain however only in coccyx region no longer present in vaginal area-GOAL MET      Long Term Goals: To be accomplished in 8  weeks:  1.  Pt will demonstrate increased PFM motor performance by 1/2-1 grade for more normal function. Status @ Eval: 2/5 PFM MMT left side  Current:3/5-GOAL MET  2. Pt will report pain 0/10 for improved quality of life. Status @ Eval: 5/10 today, 6/10 at worst  Current: 2-3/10 on average with pain and only in coccyx region-progressing  3. Pt will demonstrate ability to manage changes in intrabdominal pressure with appropriate pelvic floor activation in order to decrease sensation of heaviness and improve function. Status @ Eval: poor demonstration noted with breathing  Current: patient still requires cuing on not holding her breathe at times  4. Pt will have FOTO score change of 5 points indicating improvement in function.   Status @ Eval: 29  Status @ Last PN: 21, change of 8-GOAL MET  Current: 21-GOAL MET    PLAN  []  Upgrade activities as tolerated     [x]  Continue plan of care  []  Update interventions per flow sheet       []  Discharge due to:_  []  Other:_      García Bonilla 1/15/2018  9:32 AM    Future Appointments  Date Time Provider Mlaini Milligan   1/18/2018 10:00 AM Olvin [facility]   1/22/2018 9:15 AM Olvin [facility]   1/25/2018 10:45 AM [place] [facility]

## 2018-01-18 ENCOUNTER — APPOINTMENT (OUTPATIENT)
Dept: PHYSICAL THERAPY | Age: 48
End: 2018-01-18
Payer: OTHER GOVERNMENT

## 2018-01-22 ENCOUNTER — APPOINTMENT (OUTPATIENT)
Dept: PHYSICAL THERAPY | Age: 48
End: 2018-01-22
Payer: OTHER GOVERNMENT

## 2018-01-25 ENCOUNTER — HOSPITAL ENCOUNTER (OUTPATIENT)
Dept: PHYSICAL THERAPY | Age: 48
Discharge: HOME | End: 2018-01-25
Payer: OTHER GOVERNMENT

## 2018-01-25 PROCEDURE — 97530 THERAPEUTIC ACTIVITIES: CPT

## 2018-01-25 NOTE — PROGRESS NOTES
[facility] at [address] [place] [address]  Phone: [phone]   Fax: [phone]    Discharge Summary    Patient name: Kay Oh     Start of Care: 2017  Referral source: Sami Hernandez MD    : 1970  Medical/Treatment Diagnosis: Pelvic and perineal pain [R10.2]  Other muscle spasm [M62.838]  Onset Date:  Prior Hospitalization: see medical history   Provider#: [id number]  Comorbidities: fractures coccyx, SI joint pain, endometriosis, urge incontinence, hemorrhoidectomy 5 years ago, uterine prolapse   Prior Level of Function: patient was independent with ADLs and activities       Medications: Verified on Patient Summary List    Visits from Start of Care: 14    Missed Visits: 0  Reporting Period : 2017 to 2018    Short Term Goals: To be accomplished in 4  weeks:   1. Pt will demonstrate home exercise program accurately/including isolated PFM contractions as adjunct to PT clinic visits to promote healthy lifestyle and increased quality of life. Status @ Eval: to be initiated when appropriate  Discharge: patient compliant-GOAL MET  2. Pt will have reduced symptoms of POP (perineal heaviness/pressure) by 25-50% by pt report for improved function and increased quality of life. Status @ Eval: 0%  Discharge: 100% improvement with heaviness-GOAL MET  3. Pt will report pain decreased by 50% or greater for increased quality of life. Status @ Eval: 0%  Discharge: 2-3/10 on average with pain however only in coccyx region no longer present in vaginal area-GOAL MET      Long Term Goals: To be accomplished in 8  weeks:  1. Pt will demonstrate increased PFM motor performance by 1/2-1 grade for more normal function. Status @ Eval: 2/5 PFM MMT left side  Discharge:3/5-GOAL MET  2. Pt will report pain 0/10 for improved quality of life.   Status @ Eval: 5/10 today, 6/10 at worst  Discharge: 2-3/10 on average with pain and only in coccyx region-progressing  3. Pt will demonstrate ability to manage changes in intrabdominal pressure with appropriate pelvic floor activation in order to decrease sensation of heaviness and improve function. Status @ Eval: poor demonstration noted with breathing  Discharge: patient able to perform without holding her breathe-GOAL MET  4. Pt will have FOTO score change of 5 points indicating improvement in function. Status @ Eval: 29  Status @ Last PN: 21, change of 8-GOAL MET  Discharge: 21-GOAL MET       Assessment/ Summary of Care:   Patient to be discharged at this time. Patient is to have surgery tomorrow of complete hysterectomy and botox injection. She is compliant and independent with her HEP.    RECOMMENDATIONS:  [x]Discontinue therapy: [x]Patient has reached or is progressing toward set goals      []Patient is non-compliant or has abdicated      []Due to lack of appreciable progress towards set goals    Liberty Blount 1/25/2018 11:20 AM

## 2018-01-25 NOTE — PROGRESS NOTES
PF DAILY TREATMENT NOTE [facility] 3-16    Patient Name: Clarisa Marin  Date:2018  : 1970  [x]  Patient  Verified  Payor:  / Plan: [facility]  RETIREES AND DEPENDENTS / Product Type: Hugollgardenia Sax /    In time:10:45  Out time:11:15  Total Treatment Time (min): 30  Visit #: 14 of 15    Treatment Area: [x] Pelvic Floor     [] Other:    SUBJECTIVE  Pain Level (0-10 scale): 1-2/10  Any medication changes, allergies to medications, adverse drug reactions, diagnosis change, or new procedure performed?: [x] No    [] Yes (see summary sheet for update)  Subjective functional status/changes:   [] No changes reported      OBJECTIVE    30 min Therapeutic Activity:  []  See flow sheet :    []  Increase Tissue extensibility        []  Assess fiber intake    [x]  Assess voiding habits  []  Assess bowel habits  [x]  Other: reviewed HEP and performed FOTO with patient to ensure no questions to quantify functional changes with condition    Rationale: increase awareness  to improve the patients ability to maintain HEP        Other Objective/Functional Measures:   Refer to goals       Pain Level (0-10 scale) post treatment: 1-2/10    ASSESSMENT/Changes in Function:   Patient to be discharged at this time. Patient is to have surgery tomorrow of complete hysterectomy and botox injection. She is compliant and independent with her HEP.      []  Decrease # of leaks   [] No change []  Improving [] Resolved     []  Decrease hypertonus [] No change []  Improving [] Resolved     []  Increase void interval [] No change []  Improving [] Resolved     [x]  Increase PF strength [] No change [x]  Improving [] Resolved     []  Increase PF endurance [] No change []  Improving [] Resolved     []  Increase endurance [] No change []  Improving [] Resolved     []  Decrease # of pads [] No change []  Improving [] Resolved     [x]  Decrease pain [] No change [x]  Improving [] Resolved     []  Increased coordination [] No change []  Improving [] Resolved     []  Increased Bowel Frequency [] No change []  Improving [] Resolved        []  See Plan of Care  []  See progress note/recertification  [x]  See Discharge Summary         Progress towards goals / Updated goals:  Short Term Goals: To be accomplished in 4  weeks:   1. Pt will demonstrate home exercise program accurately/including isolated PFM contractions as adjunct to PT clinic visits to promote healthy lifestyle and increased quality of life. Status @ Eval: to be initiated when appropriate  Discharge: patient compliant-GOAL MET  2. Pt will have reduced symptoms of POP (perineal heaviness/pressure) by 25-50% by pt report for improved function and increased quality of life. Status @ Eval: 0%  Discharge: 100% improvement with heaviness-GOAL MET  3. Pt will report pain decreased by 50% or greater for increased quality of life. Status @ Eval: 0%  Discharge: 2-3/10 on average with pain however only in coccyx region no longer present in vaginal area-GOAL MET      Long Term Goals: To be accomplished in 8  weeks:  1. Pt will demonstrate increased PFM motor performance by 1/2-1 grade for more normal function. Status @ Eval: 2/5 PFM MMT left side  Discharge:3/5-GOAL MET  2. Pt will report pain 0/10 for improved quality of life. Status @ Eval: 5/10 today, 6/10 at worst  Discharge: 2-3/10 on average with pain and only in coccyx region-progressing  3. Pt will demonstrate ability to manage changes in intrabdominal pressure with appropriate pelvic floor activation in order to decrease sensation of heaviness and improve function. Status @ Eval: poor demonstration noted with breathing  Discharge: patient able to perform without holding her breathe-GOAL MET  4. Pt will have FOTO score change of 5 points indicating improvement in function.   Status @ Eval: 29  Status @ Last PN: 21, change of 8-GOAL MET  Discharge: 21-GOAL MET    PLAN  []  Upgrade activities as tolerated     []  Continue plan of care  []  Update interventions per flow sheet       [x]  Discharge due to:_  []  Other:_      Deedee Bloch 1/25/2018  10:54 AM    No future appointments.

## 2018-03-15 ENCOUNTER — HOSPITAL ENCOUNTER (OUTPATIENT)
Dept: PHYSICAL THERAPY | Age: 48
Discharge: HOME | End: 2018-03-15
Payer: OTHER GOVERNMENT

## 2018-03-15 PROCEDURE — 97162 PT EVAL MOD COMPLEX 30 MIN: CPT

## 2018-03-15 NOTE — PROGRESS NOTES
PF EVALUATION/TREATMENT NOTE     Patient Name: Jamilah Wilde  Date:3/15/2018  : 1970  [x]  Patient  Verified  Payor:  / Plan: [facility]  RETIREES AND DEPENDENTS / Product Type: Magda Mccabe /    In time:9:50  Out time:10:30  Total Treatment Time (min): 40  Visit #: 1 of 8    Treatment Area: [x] Pelvic Floor     [] Other:    SUBJECTIVE  Any medication changes, allergies to medications, adverse drug reactions, diagnosis change, or new procedure performed?: [x] No    [] Yes (see summary sheet for update)    Pain Level : 1/10  OBJECTIVE     Pelvic Floor Dysfunction Evaluation    Musculoskeletal Screen: N/A    Skin Integrity:  [] Healthy [x] Red vulva [] Labia Atrophy [] Fragile    Sensation: [x] Intact [] Diminished:    Muscle Bulk: [x] Symmetrical  [] Well-developed [] Atrophied:  []L   []R   []B    Prolapse: N/A [] Cystocele:   [] Rectocele:    PERF Score (Performance/Endurance/Repetitions/Flicks)   P: 2 E: R:  F:  Total:  only only MMT performed     Accessory Muscle Use:     Patient has failed previous pelvic floor muscle training? [] Yes    [x] No     40 min [x]Eval                  []Re-Eval       Other Objective/Functional Measures: FOTO: 33 PFDI Pain    Pain Level (0-10 scale) post treatment: 1/10    ASSESSMENT:   Patient is a 45 yo female who presents with deficits following complete hysterectomy and botox injection 2018. Patient had previously received pelvic floor physical therapy which did address some of the pain issues however patient decided to move forward with surgery. Pain rated at 1/10 today, lowest 1/10, and worst 10/10. Pain described as \"pokey\" pain on left side>right side and increased noted sacrococcygeal pain especially with prolonged sitting. She has been utilizing estrogen medication patch and has vaginal pills to assist with hormonal changes which she has not started yet. Pills are to be used vaginally.  Patient was released from restrictions last week and is able to ambulate 2 miles comfortably. She did not mention if she resumed sexual relations for PT to determine tolerance. PT will f/u next visit. Patient also reported incontinence with urgency which is minimal and improved since surgery. Patient reported no bowel issues. Examination revealed patient had 2/5 PFM MMT on left side, hypertonicity bilaterally however left>right, increased TTP left>right especially within obturator internus and iliococcygeus, and restrictions with perineal body left>right. Patient demonstrates abodominal use of accessory muscles with PFM contractions. Patient may benefit from physical therapy to address chronic pelvic pain s/p surgery and and botox injection with pelvic floor dysfunction.      [x]  See Plan of Care  []  See progress note/recertification  []  See Discharge Summary         Progress towards goals / Updated goals:  Short Term Goals: To be accomplished in 4  weeks:  1. Patient will demonstrate home exercise program accurately as adjunct to PT clinic visits to promote healthy lifestyle and increase quality of life. Status @ Eval: initiate 2nd visit    2. Patient will report ability to utilize Dilator progressively with no pain to promote decrease of symptoms and increase quality of life. Status @ Eval: as indicated   3. Patient will report pain decreased to 0-1/3 on Marinoff scale for more normal function and increased quality of life. Status @ Eval: determine next visit      Long Term Goals: To be accomplished in 8  weeks:  1. Patient will have FOTO score for PFDI Pain decreased by 5-8% points indicating improvement in function and report of no difficulty with maintaining intimate relations due to pain. Status @ Eval: 33 PFDI   2. Patient will be able to participate in sexual relations with no discomfort with 0/3 Marinoff score for increased quality of life and more normal function. Status @ Eval: determine next visit   3.  Patient will be independent with HEP at time of discharge to be able to continue with pelvic floor maintenance program.   Status @ Eval: initiate 2nd visit  4. Patient report decreased pain to 0-1/10 at most with activities to be able to tolerate sitting for prolonged periods of time. Status @ Eval: 1/10, worst 10/10  5. Patient will have improved PFM MMT grade by 1/2-1 grade to increase stability within pelvic girdle region following surgery.    Status @ Eval: 2/5 MMT left    PLAN  []  Upgrade activities as tolerated     [x]  Continue plan of care  []  Update interventions per flow sheet       []  Discharge due to:_  []  Other:_      Bruno Farrell 3/15/2018  9:54 AM

## 2018-03-15 NOTE — PROGRESS NOTES
[facility] at [address] [place] [address]  Phone: [phone]   Fax: [phone]      Plan of Care/ Statement of Necessity for Physical Therapy Services    Patient name: Roberta Colvin Start of Care: 3/15/2018   Referral source: Rhonda Mabry MD : 1970    Medical Diagnosis: Pelvic pain [R10.2]   Onset Date:2018    Treatment Diagnosis: complete hysterectomy, pelvic pain/perineal pain    Prior Hospitalization: see medical history Provider#: [id number]   Medications: Verified on Patient summary List    Comorbidities: hemorrhoidectomy , SIJ dysfunction/lumbar pain     Prior Level of Function: patient was independent with ADLs and activities          The Plan of Care and following information is based on the information from the initial evaluation. Assessment/ key information:   Patient is a 45 yo female who presents with deficits following complete hysterectomy and botox injection 2018. Patient had previously received pelvic floor physical therapy which did address some of the pain issues however patient decided to move forward with surgery. Pain rated at 1/10 today, lowest 1/10, and worst 10/10. Pain described as \"pokey\" pain on left side>right side and increased noted sacrococcygeal pain especially with prolonged sitting. She has been utilizing estrogen medication patch and has vaginal pills to assist with hormonal changes which she has not started yet. Pills are to be used vaginally. Patient was released from restrictions last week and is able to ambulate 2 miles comfortably. She did not mention if she resumed sexual relations for PT to determine tolerance. PT will f/u next visit. Patient also reported incontinence with urgency which is minimal and improved since surgery. Patient reported no bowel issues.  Examination revealed patient had 2/5 PFM MMT on left side, hypertonicity bilaterally however left>right, increased TTP left>right especially within obturator internus and iliococcygeus, and restrictions with perineal body left>right. Patient demonstrates abodominal use of accessory muscles with PFM contractions. Patient may benefit from physical therapy to address chronic pelvic pain s/p surgery and and botox injection with pelvic floor dysfunction. Evaluation Complexity History MEDIUM  Complexity : 1-2 comorbidities / personal factors will impact the outcome/ POC ; Examination MEDIUM Complexity : 3 Standardized tests and measures addressing body structure, function, activity limitation and / or participation in recreation  ;Presentation MEDIUM Complexity : Evolving with changing characteristics  ; Clinical Decision Making MEDIUM Complexity : FOTO score of 26-74  Overall Complexity Rating: MEDIUM    Problem List: Pelvic pain/dysfunction, Decreased pelvic floor mm awareness, Decreased pelvic floor mm strength, Use of accessory muscles, Improper voiding habits, Hypertonus of pelvic floor, Urinary urgency and Othercolorectal issues     Treatment Plan may include any combination of the following:   Therapeutic exercise, Urge suppression techniques, Neuromuscular re-education, Manual therapy, Physical agent/modality, Patient education and OtherSEMG/biofeedback, electrical stimulation, and therapeutics activities   Patient / Family readiness to learn indicated by: asking questions, trying to perform skills and interest    Persons(s) to be included in education: patient (P)    Barriers to Learning/Limitations: None    Patient Goal (s): become stronger in pelvic region and abdomen, keep pain low    Patient Self Reported Health Status: good    Rehabilitation Potential: good    Short Term Goals: To be accomplished in 4  weeks:  1. Patient will demonstrate home exercise program accurately as adjunct to PT clinic visits to promote healthy lifestyle and increase quality of life. Status @ Eval: initiate 2nd visit    2.  Patient will report ability to utilize Dilator progressively with no pain to promote decrease of symptoms and increase quality of life. Status @ Eval: as indicated   3. Patient will report pain decreased to 0-1/3 on Marinoff scale for more normal function and increased quality of life. Status @ Eval: determine next visit     Long Term Goals: To be accomplished in 8  weeks:  1. Patient will have FOTO score for PFDI Pain decreased by 5-8% points indicating improvement in function and report of no difficulty with maintaining intimate relations due to pain. Status @ Eval: 33 PFDI   2. Patient will be able to participate in sexual relations with no discomfort with 0/3 Marinoff score for increased quality of life and more normal function. Status @ Eval: determine next visit   3. Patient will be independent with HEP at time of discharge to be able to continue with pelvic floor maintenance program.   Status @ Eval: initiate 2nd visit  4. Patient report decreased pain to 0-1/10 at most with activities to be able to tolerate sitting for prolonged periods of time. Status @ Eval: 1/10, worst 10/10  5. Patient will have improved PFM MMT grade by 1/2-1 grade to increase stability within pelvic girdle region following surgery. Status @ Eval: 2/5 MMT left     Frequency / Duration: Patient to be seen 1 times per week for 8 weeks. Patient/ Caregiver education and instruction: Diagnosis, prognosis, Other POC   [x]  Plan of care has been reviewed with MAR Ibanez 3/15/2018 9:55 AM    ________________________________________________________________________    I certify that the above Therapy Services are being furnished while the patient is under my care. I agree with the treatment plan and certify that this therapy is necessary.     [de-identified] Signature:___________________  Date:____________Time: _________    Please sign and return to [facility] at [address] [place] [address]     Phone: [phone]   Fax: [phone]

## 2018-03-26 ENCOUNTER — HOSPITAL ENCOUNTER (OUTPATIENT)
Dept: PHYSICAL THERAPY | Age: 48
Discharge: HOME | End: 2018-03-26
Payer: OTHER GOVERNMENT

## 2018-03-26 PROCEDURE — 97140 MANUAL THERAPY 1/> REGIONS: CPT

## 2018-03-26 NOTE — PROGRESS NOTES
PF DAILY TREATMENT NOTE 3-16    Patient Name: Maribel Mata  Date:3/26/2018  : 1970  [x]  Patient  Verified  Payor:  / Plan: [facility]  RETIREES AND DEPENDENTS / Product Type: Mahogany Fell /    In time:08:00  Out time:08:45  Total Treatment Time (min): 39  Visit #: 2 of 8    Treatment Area: [] Pelvic Floor     [] Other:    SUBJECTIVE  Pain Level (0-10 scale): 0/10  Any medication changes, allergies to medications, adverse drug reactions, diagnosis change, or new procedure performed?: [x] No    [] Yes (see summary sheet for update)  Subjective functional status/changes:   [x] No changes reported      OBJECTIVE    Modality rationale: decrease pain and increase tissue extensibility to improve the patients ability to increase PFM motility/relaxation    Min Type Additional Details    - Estim:  -Unatt       -IFC  -Premod                        -Other:  -w/ice   -w/heat  Position:  Location:    - Estim: -Att    -TENS instruct  -NMES                    -Other:  -w/US   -w/ice   -w/heat  Position:  Location:    -  Ultrasound: -Continuous   - Pulsed                           -1MHz   -3MHz Position:  Location:   15 -  Ice     X  heat  -  Ice massage  -  Laser   -  Anodyne Position: supine  Location: coccyx   - Skin assessment post-treatment:  -intact -redness- no adverse reaction    -redness  adverse reaction:     25 min Manual Therapy:  MFR/trigger point release bilateral puborectalis, obturator internus, and iliococcygeus and coccygeus    Rationale: decrease pain, increase ROM, increase tissue extensibility and decrease trigger points to increase PFM motiltiy   Other Objective/Functional Measures:       Pain Level (0-10 scale) post treatment: 0/10    ASSESSMENT/Changes in Function:   Patient tolerate treatment fair with continued noted increased tone and trigger areas left>right especially towards posterior aspect of PFM and pelvic region. Noted releases bilaterally.      []  Decrease # of leaks   [] No change []  Improving [] Resolved     []  Decrease hypertonus [] No change []  Improving [] Resolved     []  Increase void interval [] No change []  Improving [] Resolved     []  Increase PF strength [] No change []  Improving [] Resolved     []  Increase PF endurance [] No change []  Improving [] Resolved     []  Increase endurance [] No change []  Improving [] Resolved     []  Decrease # of pads [] No change []  Improving [] Resolved     []  Decrease pain [] No change []  Improving [] Resolved     []  Increased coordination [] No change []  Improving [] Resolved     []  Increased Bowel Frequency [] No change []  Improving [] Resolved       Patient will continue to benefit from skilled PT services to modify and progress therapeutic interventions, address functional mobility deficits, address ROM deficits, address strength deficits and analyze and address soft tissue restrictions to attain remaining goals. []  See Plan of Care  []  See progress note/recertification  []  See Discharge Summary         Progress towards goals / Updated goals:  Short Term Goals: To be accomplished in 4  weeks:  1. Patient will demonstrate home exercise program accurately as adjunct to PT clinic visits to promote healthy lifestyle and increase quality of life. Status @ Eval: initiate 2nd visit    2. Patient will report ability to utilize Dilator progressively with no pain to promote decrease of symptoms and increase quality of life. Status @ Eval: as indicated   3. Patient will report pain decreased to 0-1/3 on Marinoff scale for more normal function and increased quality of life. Status @ Eval: determine next visit   Current: no complaints of pain with entry, so 0-1/3 with internal treatment      Long Term Goals: To be accomplished in 8  weeks:  1.  Patient will have FOTO score for PFDI Pain decreased by 5-8% points indicating improvement in function and report of no difficulty with maintaining intimate relations due to pain.  Status @ Eval: 33 PFDI   2. Patient will be able to participate in sexual relations with no discomfort with 0/3 Marinoff score for increased quality of life and more normal function. Status @ Eval: determine next visit   3. Patient will be independent with HEP at time of discharge to be able to continue with pelvic floor maintenance program.   Status @ Eval: initiate 2nd visit  4. Patient report decreased pain to 0-1/10 at most with activities to be able to tolerate sitting for prolonged periods of time. Status @ Eval: 1/10, worst 10/10  5. Patient will have improved PFM MMT grade by 1/2-1 grade to increase stability within pelvic girdle region following surgery.    Status @ Eval: 2/5 MMT left    PLAN  []  Upgrade activities as tolerated     [x]  Continue plan of care  []  Update interventions per flow sheet       []  Discharge due to:_  []  Other:_      Ping Cardona 3/26/2018  8:46 AM    Future Appointments  Date Time Provider Malini Milligan   4/4/2018 3:00 PM Ol[facility]   4/11/2018 3:00 PM Ol[facility]   4/18/2018 3:00 PM Ping Cardona [facility]   4/25/2018 3:45 PM Olvin [facility]

## 2018-04-04 ENCOUNTER — HOSPITAL ENCOUNTER (OUTPATIENT)
Dept: PHYSICAL THERAPY | Age: 48
Discharge: HOME | End: 2018-04-04
Payer: OTHER GOVERNMENT

## 2018-04-04 PROCEDURE — 97140 MANUAL THERAPY 1/> REGIONS: CPT

## 2018-04-04 NOTE — PROGRESS NOTES
PF DAILY TREATMENT NOTE 3-    Patient Name: Tiara Ramirez  Date:2018  : 1970  [x]  Patient  Verified  Payor:  / Plan: [facility]  RETIREES AND DEPENDENTS / Product Type: Honorioessence Horace /    In time:2:15  Out time:03:00  Total Treatment Time (min): 45  Visit #: 3 of 8    Treatment Area: [x] Pelvic Floor     [] Other:    SUBJECTIVE  Pain Level (0-10 scale): 1/10  Any medication changes, allergies to medications, adverse drug reactions, diagnosis change, or new procedure performed?: [x] No    [] Yes (see summary sheet for update)  Subjective functional status/changes:   [x] No changes reported    OBJECTIVE  Modality rationale: decrease pain and increase tissue extensibility to improve the patients ability to increase PFM motility/relaxation    Min Type Additional Details     - Estim:  -Unatt       -IFC  -Premod                        -Other:  -w/ice   -w/heat  Position:  Location:     - Estim: -Att    -TENS instruct  -NMES                    -Other:  -w/US   -w/ice   -w/heat  Position:  Location:     -  Ultrasound: -Continuous   - Pulsed                           -1MHz   -3MHz Position:  Location:   15 -  Ice     X  heat  -  Ice massage  -  Laser   -  Anodyne Position: supine  Location: coccyx   - Skin assessment post-treatment:  -intact -redness- no adverse reaction    -redness  adverse reaction:      25 min Manual Therapy:  MFR/trigger point release bilateral puborectalis, obturator internus, and iliococcygeus and coccygeus    Rationale: decrease pain, increase ROM, increase tissue extensibility and decrease trigger points to increase PFM motiltiy     Other Objective/Functional Measures:     Pain Level (0-10 scale) post treatment: 3/10    ASSESSMENT/Changes in Function:   Patient tolerate treatment fair with continued noted increased tone and trigger areas left>right especially towards posterior aspect of PFM and lateral vaginal wall along iliococcygeus. Noted releases bilaterally.      [] Decrease # of leaks   [] No change []  Improving [] Resolved     [x]  Decrease hypertonus [] No change [x]  Improving [] Resolved     []  Increase void interval [] No change []  Improving [] Resolved     [x]  Increase PF strength [x] No change []  Improving [] Resolved     []  Increase PF endurance [] No change []  Improving [] Resolved     []  Increase endurance [] No change []  Improving [] Resolved     []  Decrease # of pads [] No change []  Improving [] Resolved     [x]  Decrease pain [] No change [x]  Improving [] Resolved     []  Increased coordination [] No change []  Improving [] Resolved     []  Increased Bowel Frequency [] No change []  Improving [] Resolved     Patient will continue to benefit from skilled PT services to modify and progress therapeutic interventions, address functional mobility deficits, address ROM deficits, address strength deficits and analyze and address soft tissue restrictions to attain remaining goals.      []  See Plan of Care  []  See progress note/recertification  []  See Discharge Summary      Progress towards goals / Updated goals:  Short Term Goals: To be accomplished in 4  weeks:  1. Patient will demonstrate home exercise program accurately as adjunct to PT clinic visits to promote healthy lifestyle and increase quality of life. Status @ Eval: initiate 2nd visit    2. Patient will report ability to utilize Dilator progressively with no pain to promote decrease of symptoms and increase quality of life. Status @ Eval: as indicated   3. Patient will report pain decreased to 0-1/3 on Marinoff scale for more normal function and increased quality of life. Status @ Eval: determine next visit   Current: no complaints of pain with entry, so 0-1/3 with internal treatment       Long Term Goals: To be accomplished in 8  weeks:  1.  Patient will have FOTO score for PFDI Pain decreased by 5-8% points indicating improvement in function and report of no difficulty with maintaining intimate relations due to pain. Status @ Eval: 33 PFDI   2. Patient will be able to participate in sexual relations with no discomfort with 0/3 Marinoff score for increased quality of life and more normal function. Status @ Eval: determine next visit   3. Patient will be independent with HEP at time of discharge to be able to continue with pelvic floor maintenance program.   Status @ Eval: initiate 2nd visit  4. Patient report decreased pain to 0-1/10 at most with activities to be able to tolerate sitting for prolonged periods of time. Status @ Eval: 1/10, worst 10/10  5. Patient will have improved PFM MMT grade by 1/2-1 grade to increase stability within pelvic girdle region following surgery.    Status @ Eval: 2/5 MMT left  Current: 2/5 poor motility     PLAN  []  Upgrade activities as tolerated     [x]  Continue plan of care  []  Update interventions per flow sheet       []  Discharge due to:_  []  Other:_      Bassem Garcias 4/4/2018  2:20 PM    Future Appointments  Date Time Provider Malini Milligan   4/4/2018 3:00 PM Olvin [facility]   4/11/2018 3:00 PM Olvin [facility]   4/18/2018 3:00 PM Bassem Garcias [facility]   4/25/2018 3:45 PM Olvin [facility]

## 2018-04-11 ENCOUNTER — HOSPITAL ENCOUNTER (OUTPATIENT)
Dept: PHYSICAL THERAPY | Age: 48
Discharge: HOME | End: 2018-04-11
Payer: OTHER GOVERNMENT

## 2018-04-11 PROCEDURE — 97140 MANUAL THERAPY 1/> REGIONS: CPT

## 2018-04-11 PROCEDURE — 97014 ELECTRIC STIMULATION THERAPY: CPT

## 2018-04-11 NOTE — PROGRESS NOTES
PF DAILY TREATMENT NOTE 3-16    Patient Name: Rossy Briceno  Date:2018  : 1970  [x]  Patient  Verified  Payor:  / Plan: [facility]  RETIREES AND DEPENDENTS / Product Type: Sultana Jung /    In time:03:05  Out time:03:40  Total Treatment Time (min): 35  Visit #: 4 of 8    Treatment Area: [x] Pelvic Floor     [] Other:    SUBJECTIVE  Pain Level (0-10 scale): /10  Any medication changes, allergies to medications, adverse drug reactions, diagnosis change, or new procedure performed?: [x] No    [] Yes (see summary sheet for update)  Subjective functional status/changes:   [] No changes reported      OBJECTIVE    Modality rationale: decrease pain and increase tissue extensibility to improve the patients ability to increase tissue mobility and relaxation    Min Type Additional Details   15 [x] Estim:  [x]Unatt       []IFC  []Premod                        []Other:  []w/ice   [x]w/heat  Position:  Location:    [] Estim: []Att    []TENS instruct  []NMES                    []Other:  []w/US   []w/ice   []w/heat  Position:  Location:    []  Ultrasound: []Continuous   [] Pulsed                           []1MHz   []3MHz Position:  Location:    []  Ice     []  heat  []  Ice massage  []  Laser   []  Anodyne Position:  Location:   [] Skin assessment post-treatment:  []intact []redness- no adverse reaction    []redness  adverse reaction:     20 min Manual Therapy:  MFR/trigger point release bilateral puborectalis and obturator internus    Rationale: decrease pain, increase ROM, increase tissue extensibility and decrease trigger points to increase PFM motility     Other Objective/Functional Measures: FOTO: perform next visit     Pain Level (0-10 scale) post treatment: 0/10    ASSESSMENT/Changes in Function:   Patient continues to have trigger points on left side with noted release following manual work. PT discussed dilator with patient as she reported pain with penetration.    Patient will continue to benefit from skilled PT services to modify and progress therapeutic interventions, address functional mobility deficits, address ROM deficits, address strength deficits, analyze and address soft tissue restrictions and analyze and cue movement patterns to attain remaining goals. []  Decrease # of leaks   [] No change []  Improving [] Resolved     [x]  Decrease hypertonus [] No change [x]  Improving [] Resolved     []  Increase void interval [] No change []  Improving [] Resolved     []  Increase PF strength [] No change []  Improving [] Resolved     []  Increase PF endurance [] No change []  Improving [] Resolved     []  Increase endurance [] No change []  Improving [] Resolved     []  Decrease # of pads [] No change []  Improving [] Resolved     [x]  Decrease pain [] No change [x]  Improving [] Resolved     []  Increased coordination [] No change []  Improving [] Resolved     []  Increased Bowel Frequency [] No change []  Improving [] Resolved        []  See Plan of Care  [x]  See progress note/recertification  []  See Discharge Summary         Progress towards goals / Updated goals:  Short Term Goals: To be accomplished in 4  weeks:  1. Patient will demonstrate home exercise program accurately as adjunct to PT clinic visits to promote healthy lifestyle and increase quality of life. Status @ Eval: initiate 2nd visit    Status @ PN: compliant with HEP provided from last therapy bout -MET  2. Patient will report ability to utilize Dilator progressively with no pain to promote decrease of symptoms and increase quality of life. Status @ Eval: as indicated   Status @ PN: patient reported pain with penetration in which PT requested patient to report back if continue to assign dilator   3. Patient will report pain decreased to 0-1/3 on Marinoff scale for more normal function and increased quality of life. Status @ Eval: determine next visit   Status @ PN: 3/3       Long Term Goals: To be accomplished in 8  weeks:  1. Patient will have FOTO score for PFDI Pain decreased by 5-8% points indicating improvement in function and report of no difficulty with maintaining intimate relations due to pain. Status @ Eval: 33 PFDI   Status @ PN: to be tested next visit   2. Patient will be able to participate in sexual relations with no discomfort with 0/3 Marinoff score for increased quality of life and more normal function. Status @ Eval: determine next visit   3. Patient will be independent with HEP at time of discharge to be able to continue with pelvic floor maintenance program.   Status @ Eval: initiate 2nd visit  Status @ PN: compliant and independent with HEP provided from last bout of pelvic therapy-MET  4. Patient report decreased pain to 0-1/10 at most with activities to be able to tolerate sitting for prolonged periods of time. Status @ Eval: 1/10, worst 10/10  Status @ PN: 3/10 today   5. Patient will have improved PFM MMT grade by 1/2-1 grade to increase stability within pelvic girdle region following surgery.    Status @ Eval: 2/5 MMT left  Status @ PN: 2/5 increase release noted     PLAN  []  Upgrade activities as tolerated     [x]  Continue plan of care  []  Update interventions per flow sheet       []  Discharge due to:_  []  Other:_      Glenice Side 4/11/2018  3:21 PM    Future Appointments  Date Time Provider Malini Mililgan   4/18/2018 3:00 PM [facility]   [facility]

## 2018-04-18 ENCOUNTER — HOSPITAL ENCOUNTER (OUTPATIENT)
Dept: PHYSICAL THERAPY | Age: 48
Discharge: HOME | End: 2018-04-18
Payer: OTHER GOVERNMENT

## 2018-04-18 PROCEDURE — 97140 MANUAL THERAPY 1/> REGIONS: CPT

## 2018-04-18 PROCEDURE — 97014 ELECTRIC STIMULATION THERAPY: CPT

## 2018-04-18 NOTE — PROGRESS NOTES
PF DAILY TREATMENT NOTE 3-16    Patient Name: Nick Chowdary  Date:2018  : 1970  [x]  Patient  Verified  Payor:  / Plan: [facility]  RETIREES AND DEPENDENTS / Product Type:  /    In time: 03:00  Out time:03:40  Total Treatment Time (min): 40  Visit #: 5 of 8    Treatment Area: [x] Pelvic Floor     [] Other:    SUBJECTIVE  Pain Level (0-10 scale): 210  Any medication changes, allergies to medications, adverse drug reactions, diagnosis change, or new procedure performed?: [x] No    [] Yes (see summary sheet for update)  Subjective functional status/changes:   [] No changes reported  Patient reports she had relief after last visit. She did have to sit for prolonged periods of time yesterday secondary to soccer game on bleachers. OBJECTIVE    Modality rationale: decrease pain and increase tissue extensibility to improve the patients ability to increase PFM motility and relaxation   Min Type Additional Details   15 [x] Estim:  [x]Unatt       []IFC  []Premod                        []Other:  []w/ice   [x]w/heat  Position: supine  Location: parasacral region     [] Estim: []Att    []TENS instruct  []NMES                    []Other:  []w/US   []w/ice   []w/heat  Position:  Location:    []  Ultrasound: []Continuous   [] Pulsed                           []1MHz   []3MHz Position:  Location:    []  Ice     []  heat  []  Ice massage  []  Laser   []  Anodyne Position:  Location:   [] Skin assessment post-treatment:  []intact []redness- no adverse reaction    []redness  adverse reaction:       25 min Manual Therapy:  MFR/trigger point release bilateral iliococcygeus, coccygeus, and PFM    Rationale: decrease pain, increase ROM, increase tissue extensibility and decrease trigger points to increase PFM motility and coccyx mobility     Other Objective/Functional Measures:    FOTO:  25, change of 8    Pain Level (0-10 scale) post treatment: /10    ASSESSMENT/Changes in Function: Patient demonstrated improved tissue extensibility/motility following manual treatment. []  Decrease # of leaks   [] No change []  Improving [] Resolved     [x]  Decrease hypertonus [] No change [x]  Improving [] Resolved     []  Increase void interval [] No change []  Improving [] Resolved     []  Increase PF strength [] No change []  Improving [] Resolved     []  Increase PF endurance [] No change []  Improving [] Resolved     []  Increase endurance [] No change []  Improving [] Resolved     []  Decrease # of pads [] No change []  Improving [] Resolved     []  Decrease pain [] No change []  Improving [] Resolved     []  Increased coordination [] No change []  Improving [] Resolved     []  Increased Bowel Frequency [] No change []  Improving [] Resolved       Patient will continue to benefit from skilled PT services to modify and progress therapeutic interventions, address functional mobility deficits, address ROM deficits, address strength deficits, analyze and address soft tissue restrictions and analyze and cue movement patterns to attain remaining goals. []  See Plan of Care  []  See progress note/recertification  []  See Discharge Summary         Progress towards goals / Updated goals:  Short Term Goals: To be accomplished in 4  weeks:  2. Patient will report ability to utilize Dilator progressively with no pain to promote decrease of symptoms and increase quality of life. Status @ Eval: as indicated   Status @ PN: patient reported pain with penetration in which PT requested patient to report back if continue to assign dilator   Current: provided patient with dilator   3. Patient will report pain decreased to 0-1/3 on Marinoff scale for more normal function and increased quality of life. Status @ Eval: determine next visit   Status @ PN: 3/3   Current: patient was unable to determine as her  was on [address] be accomplished in [facility]:  1.  Patient will have FOTO score for PFDI Pain decreased by 5-8% points indicating improvement in function and report of no difficulty with maintaining intimate relations due to pain. Status @ Eval: 33 PFDI   Status @ PN: to be tested next visit   Current: 25, change of 8-MET  2. Patient will be able to participate in sexual relations with no discomfort with 0/3 Marinoff score for increased quality of life and more normal function. Status @ Eval: determine next visit   3. Patient will be independent with HEP at time of discharge to be able to continue with pelvic floor maintenance program.   Status @ Eval: initiate 2nd visit  Status @ PN: compliant and independent with HEP provided from last bout of pelvic therapy-MET  4. Patient report decreased pain to 0-1/10 at most with activities to be able to tolerate sitting for prolonged periods of time. Status @ Eval: 1/10, worst 10/10  Status @ PN: 3/10 today   5. Patient will have improved PFM MMT grade by 1/2-1 grade to increase stability within pelvic girdle region following surgery.    Status @ Eval: 2/5 MMT left  Status @ PN: 2/5 increase release noted     PLAN  []  Upgrade activities as tolerated     [x]  Continue plan of care  []  Update interventions per flow sheet       []  Discharge due to:_  []  Other:_      Angie Ford 4/18/2018  3:07 PM    Future Appointments  Date Time Provider Malini Milligan   4/25/2018 3:45 PM [place] [facility]

## 2018-04-25 ENCOUNTER — HOSPITAL ENCOUNTER (OUTPATIENT)
Dept: PHYSICAL THERAPY | Age: 48
Discharge: HOME | End: 2018-04-25
Payer: OTHER GOVERNMENT

## 2018-04-25 PROCEDURE — 97140 MANUAL THERAPY 1/> REGIONS: CPT

## 2018-04-25 PROCEDURE — 97014 ELECTRIC STIMULATION THERAPY: CPT

## 2018-04-25 NOTE — PROGRESS NOTES
PF DAILY TREATMENT NOTE 3-16    Patient Name: Jaqui Atwood  Date:2018  : 1970  [x]  Patient  Verified  Payor:  / Plan: [facility]  RETIREES AND DEPENDENTS / Product Type:  /    In time: 03:45 Out time:04:35  Total Treatment Time (min): 50  Visit #: 6 of 8    Treatment Area: [x] Pelvic Floor     [] Other:    SUBJECTIVE  Pain Level (0-10 scale): 1/10  Any medication changes, allergies to medications, adverse drug reactions, diagnosis change, or new procedure performed?: [x] No    [] Yes (see summary sheet for update)  Subjective functional status/changes:   [x] No changes reported      OBJECTIVE    Modality rationale: decrease pain and increase tissue extensibility to improve the patients ability to increase PFM motility    Min Type Additional Details   15 [x] Estim:  [x]Unatt       [x]IFC  []Premod                        []Other:  []w/ice   [x]w/heat  Position: supine   Location:sacrococcygeal     [] Estim: []Att    []TENS instruct  []NMES                    []Other:  []w/US   []w/ice   []w/heat  Position:  Location:    []  Ultrasound: []Continuous   [] Pulsed                           []1MHz   []3MHz Position:  Location:    []  Ice     []  heat  []  Ice massage  []  Laser   []  Anodyne Position:  Location:   [] Skin assessment post-treatment:  []intact []redness- no adverse reaction    []redness  adverse reaction:     35 min Manual Therapy:  MFR/trigger point release bilateral coccygeus and iliococcygeus with sweep up lateral wall    Rationale: decrease pain, increase ROM, increase tissue extensibility and decrease trigger points to increase PFM     Other Objective/Functional Measures:       Pain Level (0-10 scale) post treatment: 0/10    ASSESSMENT/Changes in Function:   Patient noted to have tissue release following manual treatment.      []  Decrease # of leaks   [] No change []  Improving [] Resolved     [x]  Decrease hypertonus [] No change [x]  Improving [] Resolved     [] Increase void interval [] No change []  Improving [] Resolved     []  Increase PF strength [] No change []  Improving [] Resolved     []  Increase PF endurance [] No change []  Improving [] Resolved     []  Increase endurance [] No change []  Improving [] Resolved     []  Decrease # of pads [] No change []  Improving [] Resolved     [x]  Decrease pain [] No change [x]  Improving [] Resolved     []  Increased coordination [] No change []  Improving [] Resolved     []  Increased Bowel Frequency [] No change []  Improving [] Resolved       Patient will continue to benefit from skilled PT services to modify and progress therapeutic interventions, address functional mobility deficits, address ROM deficits, address strength deficits and analyze and address soft tissue restrictions to attain remaining goals. []  See Plan of Care  []  See progress note/recertification  []  See Discharge Summary         Progress towards goals / Updated goals:  Short Term Goals: To be accomplished in 4  weeks:  2. Patient will report ability to utilize Dilator progressively with no pain to promote decrease of symptoms and increase quality of life. Status @ Eval: as indicated   Status @ PN: patient reported pain with penetration in which PT requested patient to report back if continue to assign dilator   Current: reviewed dilator and use which patient reported good tolerance   3. Patient will report pain decreased to 0-1/3 on Marinoff scale for more normal function and increased quality of life. Status @ Eval: determine next visit   Status @ PN: 3/3   Current: patient was unable to determine as her  was on [address] be accomplished in [facility]:  1. Patient will have FOTO score for PFDI Pain decreased by 5-8% points indicating improvement in function and report of no difficulty with maintaining intimate relations due to pain.   Status @ Eval: 33 PFDI   Status @ PN: to be tested next visit   Current: 25, change of 8-MET  2. Patient will be able to participate in sexual relations with no discomfort with 0/3 Marinoff score for increased quality of life and more normal function. Status @ Eval: determine next visit   3. Patient will be independent with HEP at time of discharge to be able to continue with pelvic floor maintenance program.   Status @ Eval: initiate 2nd visit  Status @ PN: compliant and independent with HEP provided from last bout of pelvic therapy-MET  4. Patient report decreased pain to 0-1/10 at most with activities to be able to tolerate sitting for prolonged periods of time. Status @ Eval: 1/10, worst 10/10  Status @ PN: 3/10 today   5. Patient will have improved PFM MMT grade by 1/2-1 grade to increase stability within pelvic girdle region following surgery.    Status @ Eval: 2/5 MMT left  Status @ PN: 2/5 increase release noted    PLAN  []  Upgrade activities as tolerated     []  Continue plan of care  []  Update interventions per flow sheet       []  Discharge due to:_  []  Other:_      Zaheer Aguiar 4/25/2018  4:39 PM    Future Appointments  Date Time Provider Malini Milligan   5/3/2018 9:45 AM [place] [facility]

## 2018-05-03 ENCOUNTER — HOSPITAL ENCOUNTER (OUTPATIENT)
Dept: PHYSICAL THERAPY | Age: 48
Discharge: HOME | End: 2018-05-03
Payer: OTHER GOVERNMENT

## 2018-05-03 PROCEDURE — 97530 THERAPEUTIC ACTIVITIES: CPT

## 2018-05-03 PROCEDURE — 97110 THERAPEUTIC EXERCISES: CPT

## 2018-05-03 NOTE — PROGRESS NOTES
[facility] [address] [place] [address]  Phone: [phone]   Fax: [phone]    Discharge Summary    Patient name: Christopher López     Start of Care: 3/15/2018  Referral source: Delores Mayo MD    : 1970  Medical/Treatment Diagnosis: Pelvic pain [R10.2]  Onset Date:2018  Prior Hospitalization: see medical history   Provider#: [id number]   Comorbidities: hemorrhoidectomy , SIJ dysfunction/lumbar pain     Prior Level of Function: patient was independent with ADLs and activities      Medications: Verified on Patient Summary List    Visits from Start of Care: 7    Missed Visits: 0  Reporting Period : 2018 to 5/3/2018    Short Term Goals: To be accomplished in 4  weeks:  2. Patient will report ability to utilize Dilator progressively with no pain to promote decrease of symptoms and increase quality of life. Status @ Eval: as indicated   Status @ PN: patient reported pain with penetration in which PT requested patient to report back if continue to assign dilator   Discharge: reviewed exercises for HEP, dilator, and pelviwand use to increase PFM motility for patient's pelvic floor program at home-MET   3. Patient will report pain decreased to 0-1/3 on Marinoff scale for more normal function and increased quality of life. Status @ Eval: determine next visit   Status @ PN: 3/3   Discharge: patient able to tolerate internal treatment so about 1-2/3 however has not had sexual relations-Progressing NOT MET       Long Term Goals: To be accomplished in 8  weeks:  2. Patient will be able to participate in sexual relations with no discomfort with 0/3 Marinoff score for increased quality of life and more normal function. Status @ Eval: determine next visit  Discharge: 1-2/3 Marinoff scale based off tolerance with internal treatment, has not had sexual relations to base off-Progressing NOT MET   4.  Patient report decreased pain to 0-1/10 at most with activities to be able to tolerate sitting for prolonged periods of time. Status @ Eval: 1/10, worst 10/10  Status @ PN: 3/10 today   Discharge: 5/10 at worst-NOT MET  5. Patient will have improved PFM MMT grade by 1/2-1 grade to increase stability within pelvic girdle region following surgery. Status @ Eval: 2/5 MMT left  Status @ PN: 2/5 increase release noted  Discharge: 2/5 no change however improved motility-Progressing NOT MET      Assessment/ Summary of Care: Patient is ready for discharge at this time. She is independent and compliant with HEP. No further skilled PT services indicated at this time.      RECOMMENDATIONS:  [x]Discontinue therapy: [x]Patient has reached or is progressing toward set goals      []Patient is non-compliant or has abdicated      []Due to lack of appreciable progress towards set goals    Scot Coombs 5/3/2018 10:24 AM

## 2018-05-03 NOTE — PROGRESS NOTES
PF DAILY TREATMENT NOTE 3-16    Patient Name: Tyree Marie  Date:5/3/2018  : 1970  [x]  Patient  Verified  Payor:  / Plan: [facility]  RETIREES AND DEPENDENTS / Product Type:  /    In time 09:50   Out time:10:15  Total Treatment Time (min): 25  Visit #: 7 of 8    Treatment Area: [x] Pelvic Floor     [] Other:    SUBJECTIVE  Pain Level (0-10 scale): 1/10  Any medication changes, allergies to medications, adverse drug reactions, diagnosis change, or new procedure performed?: [x] No    [] Yes (see summary sheet for update)  Subjective functional status/changes:   [] No changes reported  Patient is ready for discharge. OBJECTIVE      8 min Therapeutic Exercise:  [] See flow sheet :   []  Pelvic floor strengthening                 []  Pelvic floor downtraining  []  Quality pelvic floor contractions       []  Relaxation techniques  []  Urge suppression exercises  []  Other:  Rationale: increase ROM, increase strength, improve coordination and improve balance  to improve the patients ability to relax PFM        13 min Therapeutic Activity:  []  See flow sheet :    []  Increase Tissue extensibility        []  Assess fiber intake    [x]  Assess voiding habits  []  Assess bowel habits  [x]  Other:dilator and pelviwand    Rationale: increase awareness   to improve the patients ability to improve patient's progression with pelvic floor program and utilizing pelviwand and dilator appropriately for PFM motility and pain     Other Objective/Functional Measures:       Pain Level (0-10 scale) post treatment: 0/10    ASSESSMENT/Changes in Function:   Patient is ready for discharge at this time. She is independent and compliant with HEP. No further skilled PT services indicated at this time.      []  Decrease # of leaks   [] No change []  Improving [] Resolved     [x]  Decrease hypertonus [] No change [x]  Improving [] Resolved     []  Increase void interval [] No change []  Improving [] Resolved     [] Increase PF strength [] No change []  Improving [] Resolved     []  Increase PF endurance [] No change []  Improving [] Resolved     []  Increase endurance [] No change []  Improving [] Resolved     []  Decrease # of pads [] No change []  Improving [] Resolved     [x]  Decrease pain [] No change [x]  Improving [] Resolved     []  Increased coordination [] No change []  Improving [] Resolved     []  Increased Bowel Frequency [] No change []  Improving [] Resolved        []  See Plan of Care  []  See progress note/recertification  []  See Discharge Summary         Progress towards goals / Updated goals:  Short Term Goals: To be accomplished in 4  weeks:  2. Patient will report ability to utilize Dilator progressively with no pain to promote decrease of symptoms and increase quality of life. Status @ Eval: as indicated   Status @ PN: patient reported pain with penetration in which PT requested patient to report back if continue to assign dilator   Discharge: reviewed exercises for HEP, dilator, and pelviwand use to increase PFM motility for patient's pelvic floor program at home-MET   3. Patient will report pain decreased to 0-1/3 on Marinoff scale for more normal function and increased quality of life. Status @ Eval: determine next visit   Status @ PN: 3/3   Discharge: patient able to tolerate internal treatment so about 1-2/3 however has not had sexual relations-Progressing NOT MET       Long Term Goals: To be accomplished in 8  weeks:  2. Patient will be able to participate in sexual relations with no discomfort with 0/3 Marinoff score for increased quality of life and more normal function. Status @ Eval: determine next visit  Discharge: 1-2/3 Marinoff scale based off tolerance with internal treatment, has not had sexual relations to base off-Progressing NOT MET   4. Patient report decreased pain to 0-1/10 at most with activities to be able to tolerate sitting for prolonged periods of time.   Status @ Eval: 1/10, worst 10/10  Status @ PN: 3/10 today   Discharge: 5/10 at worst-NOT MET  5. Patient will have improved PFM MMT grade by 1/2-1 grade to increase stability within pelvic girdle region following surgery. Status @ Eval: 2/5 MMT left  Status @ PN: 2/5 increase release noted  Discharge: 2/5 no change however improved motility-Progressing NOT MET    PLAN  []  Upgrade activities as tolerated     []  Continue plan of care  []  Update interventions per flow sheet       [x]  Discharge due to:_goals met or progressing    []  Other:_      Isaura Cisse 5/3/2018  9:50 AM    No future appointments.

## 2019-04-05 NOTE — PROGRESS NOTES
[facility] at [address] [place] [address]  Phone: [phone]   Fax: [phone]      Pelvic Floor Progress Note  Patient name: Mariely Gama Start of Care: 3/15/2018   Referral source: Charbel Mirza MD : 1970   Medical/Treatment Diagnosis: Pelvic pain [R10.2] Onset Date:2018     Prior Hospitalization: see medical history Provider#: [id number]   Medications: Verified on Patient Summary List     Comorbidities: hemorrhoidectomy , SIJ dysfunction/lumbar pain     Prior Level of Function: patient was independent with ADLs and activities     Visits from Start of Care: 4    Missed Visits: 0    Established Goals:           Excellent Good         Limited           None  [x] Increase Pelvic MM strength       []  []  [x]  []  [x] Decrease Pelvic MM hypertonus     []  [x]  []  []  [] Decrease Incontinence Episodes    []  []  []  []   [] Improve Voiding Habits        []  []  []  []   [] Decreased Urgency        []  []  []  []  [x] Decrease Pelvic Pain        []  [x]  []  []    Short Term Goals: To be accomplished in 4  weeks:  1. Patient will demonstrate home exercise program accurately as adjunct to PT clinic visits to promote healthy lifestyle and increase quality of life. Status @ Eval: initiate 2nd visit    Status @ PN: compliant with HEP provided from last therapy bout -MET  2. Patient will report ability to utilize Dilator progressively with no pain to promote decrease of symptoms and increase quality of life. Status @ Eval: as indicated   Status @ PN: patient reported pain with penetration in which PT requested patient to report back if continue to assign dilator   3. Patient will report pain decreased to 0-1/3 on Marinoff scale for more normal function and increased quality of life. Status @ Eval: determine next visit   Status @ PN: 3/3       Long Term Goals: To be accomplished in 8  weeks:  1.  Patient will have FOTO score for PFDI Pain decreased by 5-8% points indicating improvement in function and report of no difficulty with maintaining intimate relations due to pain. Status @ Eval: 33 PFDI   Status @ PN: to be tested next visit   2. Patient will be able to participate in sexual relations with no discomfort with 0/3 Marinoff score for increased quality of life and more normal function. Status @ Eval: determine next visit   3. Patient will be independent with HEP at time of discharge to be able to continue with pelvic floor maintenance program.   Status @ Eval: initiate 2nd visit  Status @ PN: compliant and independent with HEP provided from last bout of pelvic therapy-MET  4. Patient report decreased pain to 0-1/10 at most with activities to be able to tolerate sitting for prolonged periods of time. Status @ Eval: 1/10, worst 10/10  Status @ PN: 3/10 today   5. Patient will have improved PFM MMT grade by 1/2-1 grade to increase stability within pelvic girdle region following surgery. Status @ Eval: 2/5 MMT left  Status @ PN: 2/5 increase release noted     Key Functional Changes: improved relief with manual treatment     Updated Goals: to be achieved in 4 weeks:   Continue with unmet goals     ASSESSMENT/RECOMMENDATIONS:Patient continues to have trigger points on left side with noted release following manual work. PT discussed dilator with patient as she reported pain with penetration. Patient will continue to benefit from skilled PT services to modify and progress therapeutic interventions, address functional mobility deficits, address ROM deficits, address strength deficits, analyze and address soft tissue restrictions and analyze and cue movement patterns to attain remaining goals.   [x]Continue therapy per initial plan/protocol at a frequency of  1 x per week for 4 weeks  []Continue therapy with the following recommended changes:_____________________      _____________________________________________________________________  []Discontinue therapy progressing towards or have reached established goals  []Discontinue therapy due to lack of appreciable progress towards goals  []Discontinue therapy due to lack of attendance or compliance  []Await Physician's recommendations/decisions regarding therapy  []Other:________________________________________________________________    Thank you for this referral.   Kd Treviño 4/11/2018 4:34 PM  NOTE TO PHYSICIAN:  PLEASE COMPLETE THE ORDERS BELOW AND   FAX TO [facility] at [facility]    Fax: [phone]  If you are unable to process this request in 24 hours please contact our office:    [phone]      ? I have read the above report and request that my patient continue as recommended. ? I have read the above report and request that my patient continue therapy with the following changes/special instructions:___________________________________________________________  ? I have read the above report and request that my patient be discharged from therapy.     Physicians signature: _______________________________Date: _____Time:_____ Patient with one or more new problems requiring additional work-up/treatment.

## 2022-01-03 ENCOUNTER — HOSPITAL ENCOUNTER (OUTPATIENT)
Dept: PREADMISSION TESTING | Age: 52
Discharge: HOME | End: 2022-01-03
Payer: OTHER GOVERNMENT

## 2022-01-03 PROCEDURE — U0003 INFECTIOUS AGENT DETECTION BY NUCLEIC ACID (DNA OR RNA); SEVERE ACUTE RESPIRATORY SYNDROME CORONAVIRUS 2 (SARS-COV-2) (CORONAVIRUS DISEASE [COVID-19]), AMPLIFIED PROBE TECHNIQUE, MAKING USE OF HIGH THROUGHPUT TECHNOLOGIES AS DESCRIBED BY CMS-2020-01-R: HCPCS

## 2022-01-04 LAB — SARS-COV-2, NAA: DETECTED

## 2022-02-28 ENCOUNTER — HOSPITAL ENCOUNTER (OUTPATIENT)
Dept: PREADMISSION TESTING | Age: 52
Discharge: HOME | End: 2022-02-28
Payer: OTHER GOVERNMENT

## 2022-02-28 LAB — SARS-COV-2, NAA: NOT DETECTED

## 2022-02-28 PROCEDURE — U0003 INFECTIOUS AGENT DETECTION BY NUCLEIC ACID (DNA OR RNA); SEVERE ACUTE RESPIRATORY SYNDROME CORONAVIRUS 2 (SARS-COV-2) (CORONAVIRUS DISEASE [COVID-19]), AMPLIFIED PROBE TECHNIQUE, MAKING USE OF HIGH THROUGHPUT TECHNOLOGIES AS DESCRIBED BY CMS-2020-01-R: HCPCS

## 2022-03-04 ENCOUNTER — HOSPITAL ENCOUNTER (OUTPATIENT)
Age: 52
Setting detail: OUTPATIENT SURGERY
Discharge: HOME | End: 2022-03-04
Attending: INTERNAL MEDICINE | Admitting: INTERNAL MEDICINE
Payer: OTHER GOVERNMENT

## 2022-03-04 ENCOUNTER — ANESTHESIA EVENT (OUTPATIENT)
Dept: ENDOSCOPY | Age: 52
End: 2022-03-04
Payer: OTHER GOVERNMENT

## 2022-03-04 ENCOUNTER — ANESTHESIA (OUTPATIENT)
Dept: ENDOSCOPY | Age: 52
End: 2022-03-04
Payer: OTHER GOVERNMENT

## 2022-03-04 VITALS
BODY MASS INDEX: 27.17 KG/M2 | HEIGHT: 65 IN | RESPIRATION RATE: 16 BRPM | OXYGEN SATURATION: 97 % | TEMPERATURE: 97.9 F | WEIGHT: 163.1 LBS | DIASTOLIC BLOOD PRESSURE: 67 MMHG | HEART RATE: 54 BPM | SYSTOLIC BLOOD PRESSURE: 125 MMHG

## 2022-03-04 PROCEDURE — 74011250636 HC RX REV CODE- 250/636: Performed by: INTERNAL MEDICINE

## 2022-03-04 PROCEDURE — 77030021593 HC FCPS BIOP ENDOSC BSC -A: Performed by: INTERNAL MEDICINE

## 2022-03-04 PROCEDURE — 2709999900 HC NON-CHARGEABLE SUPPLY: Performed by: INTERNAL MEDICINE

## 2022-03-04 PROCEDURE — 76060000032 HC ANESTHESIA 0.5 TO 1 HR: Performed by: INTERNAL MEDICINE

## 2022-03-04 PROCEDURE — 76040000007: Performed by: INTERNAL MEDICINE

## 2022-03-04 PROCEDURE — 88305 TISSUE EXAM BY PATHOLOGIST: CPT

## 2022-03-04 PROCEDURE — 74011000250 HC RX REV CODE- 250: Performed by: NURSE ANESTHETIST, CERTIFIED REGISTERED

## 2022-03-04 PROCEDURE — 77030013991 HC SNR POLYP ENDOSC BSC -A: Performed by: INTERNAL MEDICINE

## 2022-03-04 PROCEDURE — 74011250636 HC RX REV CODE- 250/636: Performed by: NURSE ANESTHETIST, CERTIFIED REGISTERED

## 2022-03-04 RX ORDER — SODIUM CHLORIDE 0.9 % (FLUSH) 0.9 %
5-40 SYRINGE (ML) INJECTION AS NEEDED
Status: CANCELLED | OUTPATIENT
Start: 2022-03-04

## 2022-03-04 RX ORDER — MINERAL OIL
180 ENEMA (ML) RECTAL
COMMUNITY

## 2022-03-04 RX ORDER — NALOXONE HYDROCHLORIDE 0.4 MG/ML
0.1 INJECTION, SOLUTION INTRAMUSCULAR; INTRAVENOUS; SUBCUTANEOUS AS NEEDED
Status: CANCELLED | OUTPATIENT
Start: 2022-03-04

## 2022-03-04 RX ORDER — CHOLECALCIFEROL TAB 125 MCG (5000 UNIT) 125 MCG
5000 TAB ORAL DAILY
COMMUNITY

## 2022-03-04 RX ORDER — FLUMAZENIL 0.1 MG/ML
0.2 INJECTION INTRAVENOUS
Status: CANCELLED | OUTPATIENT
Start: 2022-03-04

## 2022-03-04 RX ORDER — LIDOCAINE HYDROCHLORIDE 20 MG/ML
INJECTION, SOLUTION EPIDURAL; INFILTRATION; INTRACAUDAL; PERINEURAL AS NEEDED
Status: DISCONTINUED | OUTPATIENT
Start: 2022-03-04 | End: 2022-03-04 | Stop reason: HOSPADM

## 2022-03-04 RX ORDER — SODIUM CHLORIDE 0.9 % (FLUSH) 0.9 %
5-40 SYRINGE (ML) INJECTION EVERY 8 HOURS
Status: CANCELLED | OUTPATIENT
Start: 2022-03-04

## 2022-03-04 RX ORDER — EPINEPHRINE 0.1 MG/ML
1 INJECTION INTRACARDIAC; INTRAVENOUS
Status: CANCELLED | OUTPATIENT
Start: 2022-03-04 | End: 2022-03-05

## 2022-03-04 RX ORDER — ROSUVASTATIN CALCIUM 10 MG/1
10 TABLET, COATED ORAL
COMMUNITY

## 2022-03-04 RX ORDER — ATROPINE SULFATE 0.1 MG/ML
0.5 INJECTION INTRAVENOUS
Status: CANCELLED | OUTPATIENT
Start: 2022-03-04 | End: 2022-03-05

## 2022-03-04 RX ORDER — SODIUM CHLORIDE, SODIUM LACTATE, POTASSIUM CHLORIDE, CALCIUM CHLORIDE 600; 310; 30; 20 MG/100ML; MG/100ML; MG/100ML; MG/100ML
1000 INJECTION, SOLUTION INTRAVENOUS CONTINUOUS
Status: CANCELLED | OUTPATIENT
Start: 2022-03-04

## 2022-03-04 RX ORDER — SODIUM CHLORIDE 9 MG/ML
125 INJECTION, SOLUTION INTRAVENOUS CONTINUOUS
Status: DISCONTINUED | OUTPATIENT
Start: 2022-03-04 | End: 2022-03-04 | Stop reason: HOSPADM

## 2022-03-04 RX ORDER — ALBUTEROL SULFATE 90 UG/1
2 AEROSOL, METERED RESPIRATORY (INHALATION)
COMMUNITY

## 2022-03-04 RX ORDER — MONTELUKAST SODIUM 10 MG/1
10 TABLET ORAL DAILY
COMMUNITY

## 2022-03-04 RX ORDER — PROPOFOL 10 MG/ML
INJECTION, EMULSION INTRAVENOUS AS NEEDED
Status: DISCONTINUED | OUTPATIENT
Start: 2022-03-04 | End: 2022-03-04 | Stop reason: HOSPADM

## 2022-03-04 RX ORDER — DEXTROMETHORPHAN/PSEUDOEPHED 2.5-7.5/.8
1.2 DROPS ORAL
Status: CANCELLED | OUTPATIENT
Start: 2022-03-04

## 2022-03-04 RX ORDER — NALOXONE HYDROCHLORIDE 0.4 MG/ML
0.4 INJECTION, SOLUTION INTRAMUSCULAR; INTRAVENOUS; SUBCUTANEOUS
Status: CANCELLED | OUTPATIENT
Start: 2022-03-04 | End: 2022-03-04

## 2022-03-04 RX ORDER — MAGNESIUM SULFATE 100 %
4 CRYSTALS MISCELLANEOUS AS NEEDED
Status: CANCELLED | OUTPATIENT
Start: 2022-03-04

## 2022-03-04 RX ORDER — MIDAZOLAM HYDROCHLORIDE 1 MG/ML
INJECTION, SOLUTION INTRAMUSCULAR; INTRAVENOUS AS NEEDED
Status: DISCONTINUED | OUTPATIENT
Start: 2022-03-04 | End: 2022-03-04 | Stop reason: HOSPADM

## 2022-03-04 RX ORDER — FLUMAZENIL 0.1 MG/ML
0.2 INJECTION INTRAVENOUS
Status: CANCELLED | OUTPATIENT
Start: 2022-03-04 | End: 2022-03-04

## 2022-03-04 RX ADMIN — PROPOFOL 40 MG: 10 INJECTION, EMULSION INTRAVENOUS at 08:41

## 2022-03-04 RX ADMIN — PROPOFOL 30 MG: 10 INJECTION, EMULSION INTRAVENOUS at 08:38

## 2022-03-04 RX ADMIN — PROPOFOL 50 MG: 10 INJECTION, EMULSION INTRAVENOUS at 08:33

## 2022-03-04 RX ADMIN — PROPOFOL 30 MG: 10 INJECTION, EMULSION INTRAVENOUS at 08:43

## 2022-03-04 RX ADMIN — MIDAZOLAM 1 MG: 1 INJECTION INTRAMUSCULAR; INTRAVENOUS at 08:28

## 2022-03-04 RX ADMIN — PROPOFOL 30 MG: 10 INJECTION, EMULSION INTRAVENOUS at 08:39

## 2022-03-04 RX ADMIN — PROPOFOL 30 MG: 10 INJECTION, EMULSION INTRAVENOUS at 08:46

## 2022-03-04 RX ADMIN — MIDAZOLAM 1 MG: 1 INJECTION INTRAMUSCULAR; INTRAVENOUS at 08:33

## 2022-03-04 RX ADMIN — SODIUM CHLORIDE 125 ML/HR: 9 INJECTION, SOLUTION INTRAVENOUS at 07:41

## 2022-03-04 RX ADMIN — PROPOFOL 30 MG: 10 INJECTION, EMULSION INTRAVENOUS at 08:49

## 2022-03-04 RX ADMIN — LIDOCAINE HYDROCHLORIDE 60 MG: 20 INJECTION, SOLUTION INTRAVENOUS at 08:33

## 2022-03-04 RX ADMIN — PROPOFOL 50 MG: 10 INJECTION, EMULSION INTRAVENOUS at 08:34

## 2022-03-04 RX ADMIN — PROPOFOL 30 MG: 10 INJECTION, EMULSION INTRAVENOUS at 08:55

## 2022-03-04 RX ADMIN — PROPOFOL 30 MG: 10 INJECTION, EMULSION INTRAVENOUS at 08:35

## 2022-03-04 RX ADMIN — PROPOFOL 30 MG: 10 INJECTION, EMULSION INTRAVENOUS at 08:52

## 2022-03-04 RX ADMIN — LIDOCAINE HYDROCHLORIDE 40 MG: 20 INJECTION, SOLUTION INTRAVENOUS at 08:37

## 2022-03-04 NOTE — DISCHARGE INSTRUCTIONS
Lalito Ward  [id number]  1970    COLON / EGD DISCHARGE INSTRUCTIONS    Discomfort:  Sore throat- throat lozenges or warm salt water gargle  Redness at IV site- apply warm compress to area; if redness or soreness persist- contact your physician  There may be a slight amount of blood passed from the rectum  Gaseous discomfort- walking, belching will help relieve any discomfort  You should note operate a vehicle for 12 hours  You should not engage in an occupation involving machinery or appliances for rest of today  You may note drink alcoholic beverages for at least 12 hours  Avoid making any critical decisions for at least 24 hour  DIET:   Regular diet. - however -  remember your colon is empty and a heavy meal will produce gas. Avoid these foods:  vegetables, fried / greasy foods, carbonated drinks for today     ACTIVITY:  You may resume your normal daily activities it is recommended that you spend the remainder of the day resting -  avoid any strenuous activity. CALL M.D. ANY SIGN OF:   Increasing pain, nausea, vomiting  Abdominal distension (swelling)  New increased bleeding (oral or rectal)  Fever (chills)  Pain in chest area  Bloody discharge from nose or mouth  Shortness of breath    Follow-up Instructions:   Will call with pending pathology results  F/u in clinic in 3-6 months                      Lalito Ward  [id number]  1970        DISCHARGE SUMMARY from Nurse    The following personal items collected during your admission are returned to you:   Dental Appliance: Dental Appliances: None  Vision: Visual Aid: Glasses (reading)  Hearing Aid:    Jewelry:    Clothing:    Other Valuables:    Valuables sent to safe:      PATIENT INSTRUCTIONS:    Take Home Medications:    DISCHARGE SUMMARY from Nurse    PATIENT INSTRUCTIONS:    After general anesthesia or intravenous sedation, for 24 hours or while taking prescription Narcotics:  · Limit your activities  · Do not drive and operate hazardous machinery  · Do not make important personal or business decisions  · Do  not drink alcoholic beverages  · If you have not urinated within 8 hours after discharge, please contact your surgeon on call. Report the following to your surgeon:  · Excessive pain, swelling, redness or odor of or around the surgical area  · Temperature over 100.5  · Nausea and vomiting lasting longer than 4 hours or if unable to take medications  · Any signs of decreased circulation or nerve impairment to extremity: change in color, persistent  numbness, tingling, coldness or increase pain  · Any questions    What to do at Home:  Recommended activity: as above ,   If you experience any of the following symptoms as above , please follow up with Doctor Natanael Kinney. *  Please give a list of your current medications to your Primary Care Provider. *  Please update this list whenever your medications are discontinued, doses are      changed, or new medications (including over-the-counter products) are added. *  Please carry medication information at all times in case of emergency situations. These are general instructions for a healthy lifestyle:    No smoking/ No tobacco products/ Avoid exposure to second hand smoke  Surgeon General's Warning:  Quitting smoking now greatly reduces serious risk to your health. Obesity, smoking, and sedentary lifestyle greatly increases your risk for illness    A healthy diet, regular physical exercise & weight monitoring are important for maintaining a healthy lifestyle    You may be retaining fluid if you have a history of heart failure or if you experience any of the following symptoms:  Weight gain of 3 pounds or more overnight or 5 pounds in a week, increased swelling in our hands or feet or shortness of breath while lying flat in bed. Please call your doctor as soon as you notice any of these symptoms; do not wait until your next office visit.         The discharge information has been reviewed with the patient and spouse. The patient and spouse verbalized understanding. Discharge medications reviewed with the patient and spouse and appropriate educational materials and side effects teaching were provided.   ___________________________________________________________________________________________________________________________________      Patient armband removed and shredded

## 2022-03-04 NOTE — H&P
[facility] [address] [place] [address] [id number]  Tel: [phone]  Fax: [phone]    Patient: Lalito Ward   YOB: 1970   Birth Sex: Female      Current Gender: Female      Date:  07/08/2021 11:00 AM    Historian:   self   Visit Type:  Office Visit           Completed Orders (This Visit)  Order Details Reason Side Interpretation Result Initial Treatment Date Region   Referrals: Gastroenterology. Marily Liang MD. Location: [facility]. Evaluate and treat          Weight monitoring          Dietary management education, guidance, and counseling            Assessment/Plan  # Detail Type Description    1. Assessment Early satiety (R68.81). Patient Plan This 46year old female was referred by Dr. Kely Kc for screening colonoscopy. She had an EGD more than 10 years ago due to increased acid reflux and abdominal pain. She does not recall the results of this exam.    Currently she is experiencing early satiety, regardless of the size of meal she consumes. Pertinent negatives include acid reflux, dysphagia or epigastric pain. Given her red flag symptom, we will schedule her for EGD. The risks, benefits, adverse reactions were discussed in detail today with the patient. This includes, but is not limited to, the risk of bleeding, infections, perforation, death, missed lesions, reactions to anesthesia/sedation, other. They understand and agree to undergo the procedure. undiagnosed new problem with uncertain prognosis  labs reviewed   previous notes from [facility] reviewed  tests  ordered. Plan Orders Further diagnostic evaluations ordered today include(s) UPPER GI ENDOSCOPY, DIAGNOSIS to be performed. 2. Assessment Personal history of colonic polyps (Z86.010). Patient Plan Her last COLO was 3 years ago at [facility] by Dr. David Gaines and revealed numerous polyps. She was indicated to have a repeat colo in 3 years.      Pertinent negatives include abdominal pain, changes in bowel habits, hematochezia, unintentional weight loss, personal history of cancer, N&V. She does have a history of Non Alcoholic Fatty Liver Disease of unknown etiology. Schedule for COLO with MAC, miralax prep. The risks, benefits, adverse reactions were discussed in detail today with the patient. This includes, but is not limited to, the risk of bleeding, infections, perforation, death, missed lesions, reactions to anesthesia/sedation, other. They understand and agree to undergo the procedure. Plan Orders Further diagnostic evaluations ordered today include(s) DIAGNOSTIC COLONOSCOPY to be performed. 3. Assessment Body mass index (BMI) 27.0-27.9, adult (S11.22). Plan Orders Today's instructions / counseling include(s) Dietary management education, guidance, and counseling. Weight monitoring              This 46year old  patient was referred by Mine Pickering. This 46year old female presents for Colon Cancer Screening. History of Present Illness  1. Colon Cancer Screening   Prior screening:  Colonoscopy-2018. Risk Factors: Hx of Polyps (7). There are no associated symptoms. Pertinent negatives include abdominal pain, change in bowel habits, change in stool caliber, constipation, decreased appetite, diarrhea, melena, nausea, rectal bleeding, vomiting, weight gain and weight loss. Problem List  Problem List reviewed.      Past Medical/Surgical History   (Detailed)  Disease/disorder Onset Date Management Date Comments   (7) Colon Polyps- 2018      Hemorrhoids       Asthma       Fatty Liver         Hysterectomy 2018       Achilles Tendon 1995       Jaw 1995       L Leg Varicose Vein 2019       Left Ear Stapedectomy 2003       Hemorrhoidectomy           Family History   (Detailed)    Relationship Family Member Name  Age at Death Condition Onset Age Cause of Death   Mother    Hypertension  N   Mother    Diabetes mellitus N   Sister    Cancer, breast  N     Social History  (Detailed)  Tobacco use reviewed. Preferred language is [place]. Marital Status/Family/Social Support  Marital status:      Tobacco use status: Current non-smoker. Smoking status: Never smoker. Tobacco Screening  Patient has never used tobacco. Patient has not used tobacco in the last 30 days. Patient has not used smokeless tobacco in the last 30 days. Smoking Status  Type Smoking Status Usage Per Day Years Used Pack Years Total Pack Years    Never smoker         Alcohol  There is a history of alcohol use. consumed rarely. Medications (active prior to today)  Medication Instructions Start Date Stop Date Refilled Elsewhere   hydrocodone/acetaminophen  // 07/08/2021  Y   methocarbamol  // 07/08/2021  Y   Singulair 10 mg tablet take 1 tablet by oral route  every day in the evening 02/17/2021   N   albuterol sulfate HFA 90 mcg/actuation aerosol inhaler inhale 2 puff by inhalation route  every 4 - 6 hours as needed 02/17/2021 07/08/2021  N   valacyclovir 500 mg tablet take 2 tablet by ORAL route  every day 02/17/2021   N   ProAir HFA 90 mcg/actuation aerosol inhaler inhale 2 puff by inhalation route  every 4 - 6 hours as needed 02/17/2021   N   Imvexxy Maintenance Pack 10 mcg vaginal insert insert 1 vaginal insert by vaginal route 2 times every week (every 3 or 4 days) 02/17/2021   N   Climara 0.05 mg/24 hr transdermal patch apply 1 patch by transdermal route  every week cyclically, 3 weeks on and 1 week off 02/17/2021   N   rosuvastatin 10 mg tablet take 1 tablet by oral route  every day 06/25/2021   N     Patient Status   Completed with information received for patient transitioning into care. Completed with information received for patient in a summary of care record. Medication Reconciliation  Medications reconciled today.     Medication Reviewed  Adherence Medication Name Sig Desc Elsewhere Status   prn use ProAir HFA 90 mcg/actuation aerosol inhaler inhale 2 puff by inhalation route  every 4 - 6 hours as needed N Verified   not taking hydrocodone/acetaminophen  Y Verified   not taking methocarbamol  Y Verified   taking as directed Imvexxy Maintenance Pack 10 mcg vaginal insert insert 1 vaginal insert by vaginal route 2 times every week (every 3 or 4 days) N Verified   taking as directed Climara 0.05 mg/24 hr transdermal patch apply 1 patch by transdermal route  every week cyclically, 3 weeks on and 1 week off N Verified   taking as directed Singulair 10 mg tablet take 1 tablet by oral route  every day in the evening N Verified   taking as directed valacyclovir 500 mg tablet take 2 tablet by ORAL route  every day N Verified   taking as directed rosuvastatin 10 mg tablet take 1 tablet by oral route  every day N Verified     Medications (Added, Continued or Stopped today)  Start Date Medication Directions PRN Status PRN Reason Instruction Stop Date   02/17/2021 albuterol sulfate HFA 90 mcg/actuation aerosol inhaler inhale 2 puff by inhalation route  every 4 - 6 hours as needed N   07/08/2021 02/17/2021 Climara 0.05 mg/24 hr transdermal patch apply 1 patch by transdermal route  every week cyclically, 3 weeks on and 1 week off N       hydrocodone/acetaminophen  N   07/08/2021 02/17/2021 Imvexxy Maintenance Pack 10 mcg vaginal insert insert 1 vaginal insert by vaginal route 2 times every week (every 3 or 4 days) N       methocarbamol  N   07/08/2021 02/17/2021 ProAir HFA 90 mcg/actuation aerosol inhaler inhale 2 puff by inhalation route  every 4 - 6 hours as needed N  inhaler    06/25/2021 rosuvastatin 10 mg tablet take 1 tablet by oral route  every day N      02/17/2021 Singulair 10 mg tablet take 1 tablet by oral route  every day in the evening N      02/17/2021 valacyclovir 500 mg tablet take 2 tablet by ORAL route  every day N        Allergies  Ingredient Reaction (Severity) Medication Name Comment   NO KNOWN ALLERGIES Reviewed, no changes. Orders  Status Lab Order Time Frame Comments   ordered Referrals:  Physical Therapy. Evaluate and treat     ordered CBC With Differential/Platelet     ordered Vitamin D 25     ordered Vitamin B12     ordered Comp Metabolic Panel (14) AU     ordered TSH     ordered Lipid Panel calc LDL     ordered Referrals: Allopathic & Osteopathic Physicians : Internal Medicine : Gastroenterology. Frankie Davis. Evaluate and treat     result received NMR LipoProfile+Graph     ordered Lipid Panel calc LDL     ordered Comp Metabolic Panel (14) AU     ordered Vitamin D 25     ordered Comp Metabolic Panel (14) AU     ordered Vitamin D 25     result received NMR LipoProfile+Graph     completed Referrals: Gastroenterology. John Mills MD. Location: Gastroenterology [facility]. Evaluate and treat     completed Weight monitoring     completed Dietary management education, guidance, and counseling     ordered DIAGNOSTIC COLONOSCOPY     ordered UPPER GI ENDOSCOPY, DIAGNOSIS         Review of Systems  System Neg/Pos Details   Constitutional Negative Chills, Fever, Weight gain and Weight loss. ENMT Negative Ear infections and Nasal congestion. Respiratory Negative Chronic cough, Dyspnea and Wheezing. Cardio Negative Chest pain. GI Negative Abdominal pain, Change in bowel habits, Change in stool caliber, Constipation, Decreased appetite, Diarrhea, Melena, Nausea, Rectal bleeding and Vomiting.  Negative Dysuria. Endocrine Negative Cold intolerance and Heat intolerance. Neuro Negative Dizziness and Headache. Psych Negative Anxiety and Depression. MS Negative Back pain and Joint pain. Hema/Lymph Negative Easy bleeding and Easy bruising.        Vital Signs   Height  Time ft in cm Last Measured Height Position   11:04 AM 5.0 5.35 165.99 02/08/2021 Standing     Weight/BSA/BMI  Time lb oz kg Context BMI kg/m2 BSA m2   11:04 .60  75.568 dressed with shoes 27.43      Blood Pressure  Time BP mm/Hg Position Side Site Method Cuff Size   11:04 /74          Measured by  Time Measured by   11:04 AM Mirtha Ybarrakassandra     Physical  Exam  Exam Findings Details   Constitutional Normal No acute distress. Well nourished. Well developed. Head/Face Normal Facial features - Normal. Eyebrows - Normal.   Eyes Normal General - Right: Normal, Left: Normal. Lids/external - Right: Normal, Left: Normal. Conjunctiva - Right: Normal, Left: Normal. Sclera - Right: Normal, Left: Normal.   Ears Normal Inspection - Right: Normal, Left: Normal.   Nose/Mouth/Throat Normal External nose - Normal. Lips/teeth/gums - Normal.   Nose/Mouth/Throat Normal External nose - Normal. Septum - Normal. Lips/teeth/gums - Normal. Tongue - Normal.   Neck Exam Normal Inspection - Normal. Palpation - Normal. Thyroid gland - Normal. Range of motion - Normal.   Respiratory Normal Inspection - Normal. Auscultation - Normal. Effort - Normal.   Cardiovascular Normal Inspection - JVD: Absent. Palpation/percussion - PMI normal. Heart rate - Regular rate. Rhythm - Regular. Heart sounds - Normal S1, Normal S2. Abdomen Normal Patient is not obese. Inspection - Normal. Appliance(s) - None. Abdominal muscles - Normal. Auscultation - Normal. Percussion - Normal. Anterior palpation - Normal, No guarding, No rebound. CVA tenderness - None. Umbilicus - Normal. No abdominal tenderness. No hepatic enlargement. No spleen enlargement. No hernia. No ascites. No palpable mass. Proctor's sign - Normal. No hepatic tenderness. Genitourinary Normal No CVA Tenderness. No flank mass. Skin * Body areas examined - Scalp/hair, Head/face, Neck, Abdomen. Skin Normal Inspection - Normal. Nails - Normal. Hair - Normal.   Musculoskeletal Normal Overview - Normal. Hands - Left: Normal, Right: Normal.   Neurological Normal Level of consciousness - Normal. Orientation - Normal. Memory - Normal. Cranial nerves - Cranial nerves II through XII grossly intact.  Sensory - Normal. Motor - Normal. Balance & gait - Normal. Coordination - Normal.   Psychiatric Normal Orientation - Oriented to time, place, person & situation. Not anxious. Behavior is appropriate for age. Sufficient fund of knowledge. Sufficient language. No memory loss. No mood swings. Normal insight. Normal judgment. Active Patient Care Team Members  Name Contact Agency Type Support Role Relationship Active Date Inactive Date Specialty   Lizett Murillomaxwell   Patient provider PCP   Eric 3   encounter provider    Gastroenterology   Jake Escobar    Spouse          Encounter submitted for review by Jeanna VILLALBA on 07/08/2021 12:41 PM.    Visit details reviewed and approved by supervising provider Reanna Devi MD on 07/08/2021. Document generated by: Reanna Devi 07/08/2021     Providers    Gayathri Stanton   [address]  [id number]    Lizett Murillomaxwell   [address] [place] [address].    Lizett Lares     ----------------------------------------------------------------------------------------------------------------------------------------------------------------------      Electronically signed by Veda Bloom on 07/08/2021 04:58 PM  on behalf of Jeanna VILLALBA    Patient seen and examined. No interval change.

## 2022-03-04 NOTE — PERIOP NOTES
Face to face  Discharged  instruction given to spouse/pt  and agreed with the plan. Discharged includes diet, activity limitations , medication to continue and f/u appointment depending on pathology result . D/c to home in stable condition with care of family.

## 2022-03-04 NOTE — PROCEDURES
Esophagogastroduodenoscopy Procedure Note    [employer]  [id number]      Indications: early satiety    Anesthesia/Sedation: MAC anesthesia Propofol    Assistants: Endoscopy Technician-1: Krystle Glover CNA  Endoscopy RN-1: Álvaro Sotelo RN    Pre-Procedure Exam:  Airway: clear   Heart: normal S1and S2    Lungs: clear bilateral  Abdomen: soft, nontender, bowel sounds present and normal in all quadrants   Mental Status: awake, alert, and oriented to person, place, and time      Procedure in Detail:  Informed consent was obtained for the procedure, including conscious sedation. Risks of pancreatitis, infection, perforation, hemorrhage, adverse drug reaction, and aspiration were discussed. The patient was placed in the left lateral decubitus position. Based on the pre-procedure assessment, including review of the patient's medical history, medications, allergies, and review of systems, he had been deemed to be an appropriate candidate for moderate sedation; he was therefore sedated with the medications listed above. He was monitored continuously with electrocardiogram tracing, pulse oximetry, blood pressure monitoring, and direct observation. The pediatric colonoscope was inserted into the mouth and advanced under direct vision to third portion of the duodenum. A careful inspection was made as the gastroscope was withdrawn, including a retroflexed view of the proximal stomach; findings and interventions are described below. Appropriate photodocumentation was obtained. Findings:   OROPHARYNX: Cords and pyriform recesses normal.   ESOPHAGUS: The esophagus is normal. The proximal, mid, and distal portions are normal. Few scattered gastric polyps biopsied with cold forceps biopsy. The Z-Line is intact.    STOMACH: The fundus on antegrade and retroflex views is normal. The body, antrum, and pylorus are normal.   DUODENUM: The bulb and second portions are normal.    Therapies:    biopsy of stomach Gastric polyps    Specimens: Specimens were collected and sent to pathology. Complications:   None; patient tolerated the procedure well. EBL:  None    No prosthetic devices, grafts, tissues, transplant or devices implanted. Attending Attestation:  I performed the procedure. Recommendations:  - Continue acid suppression. Signed by: Zaida Cochran MD                     3/4/2022            Colonoscopy Procedure Note    Indications: h/o polyps     Anesthesia/Sedation: MAC anesthesia Propofol    Assistants: Endoscopy Technician-1: Bill Bullard CNA  Endoscopy RN-1: Robert Ye RN    Pre-Procedure Exam:  Airway: clear   Heart: normal S1and S2    Lungs: clear bilateral  Abdomen: soft, nontender, bowel sounds present and normal in all quadrants   Mental Status: awake, alert, and oriented to person, place, and time      Procedure in Detail:  Informed consent was obtained for the procedure, including sedation. Risks of perforation, hemorrhage, adverse drug reaction, and aspiration were discussed. The patient was placed in the left lateral decubitus position. Based on the pre-procedure assessment, including review of the patient's medical history, medications, allergies, and review of systems, she had been deemed to be an appropriate candidate for moderate sedation; she was therefore sedated with the medications listed above. The patient was monitored continuously with ECG tracing, pulse oximetry, blood pressure monitoring, and direct observations. A rectal examination was performed. The [id number] was inserted into the rectum and advanced under direct vision to the cecum, which was identified by the appendiceal orifice. The quality of the colonic preparation was good. A careful inspection was made as the colonoscope was withdrawn, including a retroflexed view of the rectum; findings and interventions are described below. Appropriate photodocumentation was obtained.     ANUS: Anal exam reveals no masses or hemorrhoids, sphincter tone is normal.   RECTUM: Rectal exam reveals no masses or hemorrhoids. 2mm hard black papule extruding from tissue, unable to removed with cold forceps biopsy in LL, palpable on COLE  SIGMOID COLON: The mucosa is normal with good vascular pattern and without ulcers, diverticula, 5mm polyp removed with cold snare  DESCENDING COLON: The mucosa is normal with good vascular pattern and without ulcers, diverticula, and polyps. SPLENIC FLEXURE: The splenic flexure is normal.   TRANSVERSE COLON: The mucosa is normal with good vascular pattern and without ulcers, diverticula, Diminutive polyp removed with biopsy forceps  HEPATIC FLEXURE: The hepatic flexure is normal.   ASCENDING COLON: The mucosa is normal with good vascular pattern and without ulcers, diverticula, Diminutive polyp removed with biopsy forceps  CECUM: The appendiceal orifice appears normal. The ileocecal valve appears normal.   TERMINAL ILEUM: The terminal ileum was not entered. Specimens: No specimens were collected. EBL: None    Withdrawal Time: 14 min    Complications: None; patient tolerated the procedure well. Attending Attestation: I performed the procedure.     Recommendations:   - Await pathology.,  F/u apt in 3-6 months    Signed By: Jamaica Negro MD                      3/4/2022

## 2022-10-26 ENCOUNTER — TRANSCRIBE ORDER (OUTPATIENT)
Dept: REGISTRATION | Age: 52
End: 2022-10-26

## 2022-10-26 ENCOUNTER — HOSPITAL ENCOUNTER (OUTPATIENT)
Dept: PREADMISSION TESTING | Age: 52
Discharge: HOME | End: 2022-10-26
Payer: OTHER GOVERNMENT

## 2022-10-26 DIAGNOSIS — K64.0 FIRST DEGREE HEMORRHOIDS: ICD-10-CM

## 2022-10-26 DIAGNOSIS — K64.0 FIRST DEGREE HEMORRHOIDS: Primary | ICD-10-CM

## 2022-10-26 LAB
ERYTHROCYTE [DISTWIDTH] IN BLOOD BY AUTOMATED COUNT: 12 % (ref 11.6–14.5)
HCT VFR BLD AUTO: 35.3 % (ref 35–45)
HGB BLD-MCNC: 11.3 G/DL (ref 12–16)
MCH RBC QN AUTO: 30.5 PG (ref 24–34)
MCHC RBC AUTO-ENTMCNC: 32 G/DL (ref 31–37)
MCV RBC AUTO: 95.1 FL (ref 78–100)
NRBC # BLD: 0 K/UL (ref 0–0.01)
NRBC BLD-RTO: 0 PER 100 WBC
PLATELET # BLD AUTO: 207 K/UL (ref 135–420)
PMV BLD AUTO: 9.3 FL (ref 9.2–11.8)
RBC # BLD AUTO: 3.71 M/UL (ref 4.2–5.3)
WBC # BLD AUTO: 5.4 K/UL (ref 4.6–13.2)

## 2022-10-26 PROCEDURE — 85027 COMPLETE CBC AUTOMATED: CPT

## 2022-10-26 PROCEDURE — 36415 COLL VENOUS BLD VENIPUNCTURE: CPT

## 2022-10-27 ENCOUNTER — HOSPITAL ENCOUNTER (OUTPATIENT)
Dept: PREADMISSION TESTING | Age: 52
Discharge: HOME | End: 2022-10-27

## 2022-10-27 VITALS — HEIGHT: 65 IN | WEIGHT: 172 LBS | BODY MASS INDEX: 28.66 KG/M2

## 2022-10-27 RX ORDER — CYCLOBENZAPRINE HCL 10 MG
10 TABLET ORAL
COMMUNITY

## 2022-10-27 RX ORDER — IBUPROFEN 200 MG
200 TABLET ORAL AS NEEDED
COMMUNITY

## 2022-10-27 RX ORDER — LANOLIN ALCOHOL/MO/W.PET/CERES
1000 CREAM (GRAM) TOPICAL DAILY
COMMUNITY

## 2022-10-27 RX ORDER — SODIUM CHLORIDE, SODIUM LACTATE, POTASSIUM CHLORIDE, CALCIUM CHLORIDE 600; 310; 30; 20 MG/100ML; MG/100ML; MG/100ML; MG/100ML
125 INJECTION, SOLUTION INTRAVENOUS CONTINUOUS
Status: CANCELLED | OUTPATIENT
Start: 2022-10-27

## 2022-10-27 NOTE — PERIOP NOTES
PAT - SURGICAL PRE-ADMISSION INSTRUCTIONS    NAME:  Dangelo Neal                                                          TODAY'S DATE:  10/27/2022    SURGERY DATE:  10/31/2022                                  SURGERY ARRIVAL TIME:   TBA    Do NOT eat or drink anything, including candy or gum, after MIDNIGHT on 10/31/2022 , unless you have specific instructions from your Surgeon or Anesthesia Provider to do so. No smoking 24 hours before surgery. No alcohol 24 hours prior to the day of surgery. No recreational drugs for one week prior to the day of surgery. Leave all valuables, including money/purse, at home. Remove all jewelry, nail polish, makeup (including mascara); no lotions, powders, deodorant, or perfume/cologne/after shave. Glasses/Contact lenses and Dentures may be worn to the hospital.  They will be removed prior to surgery. Call your doctor if symptoms of a cold or illness develop within 24 ours prior to surgery. AN ADULT MUST DRIVE YOU HOME AFTER OUTPATIENT SURGERY. If you are having an OUTPATIENT procedure, please make arrangements for a responsible adult to be with you for 24 hours after your surgery. If you are admitted to the hospital, you will be assigned to a bed after surgery is complete. Normally a family member will not be able to see you until you are in your assigned bed. 12. Visitation Restrictions Explained. Special Instructions:  Covid Test not  needed Patient vaccinated , Quarantine requirements discussed  No Advanced Directive or DNR  Bring inhaler.     Patient Prep:    use CHG solution three nights prior to procedure     These surgical instructions were reviewed with patient during the PAT phone call

## 2022-10-31 ENCOUNTER — ANESTHESIA EVENT (OUTPATIENT)
Dept: SURGERY | Age: 52
End: 2022-10-31
Payer: OTHER GOVERNMENT

## 2022-10-31 ENCOUNTER — HOSPITAL ENCOUNTER (OUTPATIENT)
Age: 52
Setting detail: OUTPATIENT SURGERY
Discharge: HOME | End: 2022-10-31
Attending: SURGERY | Admitting: SURGERY
Payer: OTHER GOVERNMENT

## 2022-10-31 ENCOUNTER — ANESTHESIA (OUTPATIENT)
Dept: SURGERY | Age: 52
End: 2022-10-31
Payer: OTHER GOVERNMENT

## 2022-10-31 VITALS
HEIGHT: 65 IN | SYSTOLIC BLOOD PRESSURE: 127 MMHG | RESPIRATION RATE: 16 BRPM | BODY MASS INDEX: 28.89 KG/M2 | OXYGEN SATURATION: 97 % | WEIGHT: 173.4 LBS | TEMPERATURE: 97.3 F | DIASTOLIC BLOOD PRESSURE: 73 MMHG | HEART RATE: 59 BPM

## 2022-10-31 DIAGNOSIS — K62.1 RECTAL POLYP: Primary | ICD-10-CM

## 2022-10-31 PROCEDURE — 74011000250 HC RX REV CODE- 250: Performed by: SURGERY

## 2022-10-31 PROCEDURE — 77030040361 HC SLV COMPR DVT MDII -B: Performed by: SURGERY

## 2022-10-31 PROCEDURE — 74011250636 HC RX REV CODE- 250/636: Performed by: SURGERY

## 2022-10-31 PROCEDURE — 77030014007 HC SPNG HEMSTAT J&J -B: Performed by: SURGERY

## 2022-10-31 PROCEDURE — 2709999900 HC NON-CHARGEABLE SUPPLY: Performed by: SURGERY

## 2022-10-31 PROCEDURE — 74011250636 HC RX REV CODE- 250/636

## 2022-10-31 PROCEDURE — 77030010118 HC SHR COAG HARM J&J -E: Performed by: SURGERY

## 2022-10-31 PROCEDURE — 77030031140 HC SUT VCRL3 J&J -A: Performed by: SURGERY

## 2022-10-31 PROCEDURE — 74011000250 HC RX REV CODE- 250

## 2022-10-31 PROCEDURE — 76210000021 HC REC RM PH II 0.5 TO 1 HR: Performed by: SURGERY

## 2022-10-31 PROCEDURE — 77030018390 HC SPNG HEMSTAT2 J&J -B: Performed by: SURGERY

## 2022-10-31 PROCEDURE — 88342 IMHCHEM/IMCYTCHM 1ST ANTB: CPT

## 2022-10-31 PROCEDURE — 76010000138 HC OR TIME 0.5 TO 1 HR: Performed by: SURGERY

## 2022-10-31 PROCEDURE — 76060000032 HC ANESTHESIA 0.5 TO 1 HR: Performed by: SURGERY

## 2022-10-31 PROCEDURE — 77030020782 HC GWN BAIR PAWS FLX 3M -B: Performed by: SURGERY

## 2022-10-31 PROCEDURE — 88305 TISSUE EXAM BY PATHOLOGIST: CPT

## 2022-10-31 RX ORDER — HYDROGEN PEROXIDE 3 %
SOLUTION, NON-ORAL MISCELLANEOUS DAILY
COMMUNITY

## 2022-10-31 RX ORDER — DIAZEPAM 5 MG/1
5 TABLET ORAL
Qty: 6 TABLET | Refills: 0 | Status: SHIPPED | OUTPATIENT
Start: 2022-10-31

## 2022-10-31 RX ORDER — HYDROMORPHONE HYDROCHLORIDE 1 MG/ML
0.5 INJECTION, SOLUTION INTRAMUSCULAR; INTRAVENOUS; SUBCUTANEOUS
Status: CANCELLED | OUTPATIENT
Start: 2022-10-31

## 2022-10-31 RX ORDER — SODIUM CHLORIDE, SODIUM LACTATE, POTASSIUM CHLORIDE, CALCIUM CHLORIDE 600; 310; 30; 20 MG/100ML; MG/100ML; MG/100ML; MG/100ML
125 INJECTION, SOLUTION INTRAVENOUS CONTINUOUS
Status: DISCONTINUED | OUTPATIENT
Start: 2022-10-31 | End: 2022-10-31 | Stop reason: HOSPADM

## 2022-10-31 RX ORDER — FENTANYL CITRATE 50 UG/ML
INJECTION, SOLUTION INTRAMUSCULAR; INTRAVENOUS AS NEEDED
Status: DISCONTINUED | OUTPATIENT
Start: 2022-10-31 | End: 2022-10-31 | Stop reason: HOSPADM

## 2022-10-31 RX ORDER — ONDANSETRON 2 MG/ML
INJECTION INTRAMUSCULAR; INTRAVENOUS AS NEEDED
Status: DISCONTINUED | OUTPATIENT
Start: 2022-10-31 | End: 2022-10-31 | Stop reason: HOSPADM

## 2022-10-31 RX ORDER — PROPOFOL 10 MG/ML
INJECTION, EMULSION INTRAVENOUS
Status: DISCONTINUED | OUTPATIENT
Start: 2022-10-31 | End: 2022-10-31 | Stop reason: HOSPADM

## 2022-10-31 RX ORDER — FENTANYL CITRATE 50 UG/ML
25 INJECTION, SOLUTION INTRAMUSCULAR; INTRAVENOUS AS NEEDED
Status: CANCELLED | OUTPATIENT
Start: 2022-10-31

## 2022-10-31 RX ORDER — ALBUTEROL SULFATE 0.83 MG/ML
2.5 SOLUTION RESPIRATORY (INHALATION) AS NEEDED
Status: CANCELLED | OUTPATIENT
Start: 2022-10-31

## 2022-10-31 RX ORDER — ACETAMINOPHEN 500 MG
500 TABLET ORAL
COMMUNITY

## 2022-10-31 RX ORDER — MAGNESIUM SULFATE 100 %
4 CRYSTALS MISCELLANEOUS AS NEEDED
Status: CANCELLED | OUTPATIENT
Start: 2022-10-31

## 2022-10-31 RX ORDER — GLYCOPYRROLATE 0.2 MG/ML
INJECTION INTRAMUSCULAR; INTRAVENOUS AS NEEDED
Status: DISCONTINUED | OUTPATIENT
Start: 2022-10-31 | End: 2022-10-31 | Stop reason: HOSPADM

## 2022-10-31 RX ORDER — SODIUM CHLORIDE 0.9 % (FLUSH) 0.9 %
5-40 SYRINGE (ML) INJECTION AS NEEDED
Status: CANCELLED | OUTPATIENT
Start: 2022-10-31

## 2022-10-31 RX ORDER — KETAMINE HYDROCHLORIDE 10 MG/ML
INJECTION, SOLUTION INTRAMUSCULAR; INTRAVENOUS AS NEEDED
Status: DISCONTINUED | OUTPATIENT
Start: 2022-10-31 | End: 2022-10-31 | Stop reason: HOSPADM

## 2022-10-31 RX ORDER — SODIUM CHLORIDE 0.9 % (FLUSH) 0.9 %
5-40 SYRINGE (ML) INJECTION EVERY 8 HOURS
Status: CANCELLED | OUTPATIENT
Start: 2022-10-31

## 2022-10-31 RX ORDER — MIDAZOLAM HYDROCHLORIDE 1 MG/ML
INJECTION, SOLUTION INTRAMUSCULAR; INTRAVENOUS AS NEEDED
Status: DISCONTINUED | OUTPATIENT
Start: 2022-10-31 | End: 2022-10-31 | Stop reason: HOSPADM

## 2022-10-31 RX ORDER — SODIUM CHLORIDE, SODIUM LACTATE, POTASSIUM CHLORIDE, CALCIUM CHLORIDE 600; 310; 30; 20 MG/100ML; MG/100ML; MG/100ML; MG/100ML
75 INJECTION, SOLUTION INTRAVENOUS CONTINUOUS
Status: CANCELLED | OUTPATIENT
Start: 2022-10-31

## 2022-10-31 RX ORDER — INSULIN LISPRO 100 [IU]/ML
INJECTION, SOLUTION INTRAVENOUS; SUBCUTANEOUS ONCE
Status: CANCELLED | OUTPATIENT
Start: 2022-10-31 | End: 2022-10-31

## 2022-10-31 RX ORDER — LIDOCAINE HYDROCHLORIDE 20 MG/ML
INJECTION, SOLUTION EPIDURAL; INFILTRATION; INTRACAUDAL; PERINEURAL AS NEEDED
Status: DISCONTINUED | OUTPATIENT
Start: 2022-10-31 | End: 2022-10-31 | Stop reason: HOSPADM

## 2022-10-31 RX ADMIN — PROPOFOL 125 MCG/KG/MIN: 10 INJECTION, EMULSION INTRAVENOUS at 10:55

## 2022-10-31 RX ADMIN — KETAMINE HYDROCHLORIDE 10 MG: 10 INJECTION, SOLUTION INTRAMUSCULAR; INTRAVENOUS at 11:20

## 2022-10-31 RX ADMIN — GLYCOPYRROLATE 0.2 MG: 0.2 INJECTION INTRAMUSCULAR; INTRAVENOUS at 10:50

## 2022-10-31 RX ADMIN — CEFOXITIN SODIUM 2 G: 2 POWDER, FOR SOLUTION INTRAVENOUS at 10:53

## 2022-10-31 RX ADMIN — KETAMINE HYDROCHLORIDE 10 MG: 10 INJECTION, SOLUTION INTRAMUSCULAR; INTRAVENOUS at 10:55

## 2022-10-31 RX ADMIN — LIDOCAINE HYDROCHLORIDE 40 MG: 20 INJECTION, SOLUTION EPIDURAL; INFILTRATION; INTRACAUDAL; PERINEURAL at 10:55

## 2022-10-31 RX ADMIN — MIDAZOLAM 2 MG: 1 INJECTION INTRAMUSCULAR; INTRAVENOUS at 10:48

## 2022-10-31 RX ADMIN — KETAMINE HYDROCHLORIDE 10 MG: 10 INJECTION, SOLUTION INTRAMUSCULAR; INTRAVENOUS at 11:02

## 2022-10-31 RX ADMIN — FENTANYL CITRATE 25 MCG: 50 INJECTION, SOLUTION INTRAMUSCULAR; INTRAVENOUS at 11:35

## 2022-10-31 RX ADMIN — ONDANSETRON HYDROCHLORIDE 4 MG: 2 INJECTION INTRAMUSCULAR; INTRAVENOUS at 11:07

## 2022-10-31 RX ADMIN — SODIUM CHLORIDE, SODIUM LACTATE, POTASSIUM CHLORIDE, AND CALCIUM CHLORIDE 125 ML/HR: 600; 310; 30; 20 INJECTION, SOLUTION INTRAVENOUS at 08:46

## 2022-10-31 NOTE — ANESTHESIA POSTPROCEDURE EVALUATION
Procedure(s):  TRANSANAL EXCISION, PARTIAL THICKNESS OF RECTAL NODULE MAC + LOCAL ANESTHESIA. MAC, total IV anesthesia    Anesthesia Post Evaluation        Patient location during evaluation: PACU  Patient participation: complete - patient participated  Level of consciousness: awake and alert  Pain score: 0  Pain management: adequate  Airway patency: patent  Anesthetic complications: no  Cardiovascular status: acceptable  Respiratory status: acceptable  Hydration status: acceptable  Post anesthesia nausea and vomiting:  none  Final Post Anesthesia Temperature Assessment:  Normothermia (36.0-37.5 degrees C)      INITIAL Post-op Vital signs:   Vitals Value Taken Time   /73 10/31/22 1231   Temp 36.1 °C (97 °F) 10/31/22 1144   Pulse 59 10/31/22 1231   Resp 14 10/31/22 1149   SpO2 97 % 10/31/22 1231   Vitals shown include unvalidated device data.

## 2022-10-31 NOTE — BRIEF OP NOTE
Brief Postoperative Note    Patient: Kaylyn Garcia  YOB: 1970  MRN: [id number]    Date of Procedure: 10/31/2022     Pre-Op Diagnosis: RECTAL NODULE    Post-Op Diagnosis: Same as preoperative diagnosis.       Procedure(s):  TRANSANAL EXCISION, PARTIAL THICKNESS OF RECTAL NODULE MAC + LOCAL ANESTHESIA    Surgeon(s):  Hector Jennings DO    Surgical Assistant: Surg Asst-1: Idania Iyer    Anesthesia: MAC     Estimated Blood Loss (mL): Minimal    Complications: None    Specimens:   ID Type Source Tests Collected by Time Destination   1 : RECTAL NODULE Preservative Rectum  Hector Jennings DO 10/31/2022 1132 Pathology        Implants: * No implants in log *    Drains: * No LDAs found *    Findings: RPL rectal nodule with black FB 3 cm above the dentate line    Electronically Signed by Kyra Barnes DO on 10/31/2022 at 12:18 PM

## 2022-10-31 NOTE — H&P
Assessment/Plan  # Detail Type Description    1. Assessment Other hemorrhoids (K64.8). Impression Referral from Dr. Donnie Holloway, GI states that there is a black nodule in rectum, found during her exam, anoscopy used - needs confirmation. Rectal exam performed today, unable to visualize a nodule without use of anoscopy during this scheduled visit. She does have internal hemorrhoids. No bleeding noted, stool is in the rectal vault, needs further investigation. Plan: Exam under Sedation at [facility] with Dr. Darren Pacheco needed to identify black nodule. Explained to patient the necessity of performing at hospital vs in office. .    Patient Plan PLAN: Virtual visit with Dr. Darren Pacheco next week to discuss procedure and expectations. Plan Orders Active Medication: Anusol-HC 2.5 % topical cream with perineal applicator         2. Assessment Other specified diseases of anus and rectum (K62.89). Impression AKA: rectal pain, sharp  AKA: Skin irritation at rectum/anus. Educated patient on the effects of Hemorrhoids and how they can return or new hemorrhoids can form after a Hemorrhoidectomy. In her case, she had the procedure before 2012. The topical medication can provide short-term relief by reducing symptoms, such as the anusol for rectal pain, but lifestyle changes need to be made to prevent further hemorrhoids from forming. .    Patient Plan PLAN: Continue to use the anusol as needed for rectal pain and incorporate Balneol for cleansing, Calmoseptine for barrier treatment, and Recticare for Inflammation and rectal pain. Samples provided with education. Provider Plan Re-order Anusol px         3. Assessment Pruritus ani (L29.0). Impression The patient's diagnosis, results and disease process was discussed at length as well as the plan for treatment and alternatives and risks/benefits. Bowel hygiene discussed. Pt understood problems will worsen or recur without these changes. Avoid baby wipes and skin trauma. Recommend Calmoseptine for perianal skin protection and balneol for perianal cleansing. PLAN: Education on the effects of wet wipes for cleansing stressed - encouraged to stop using her current cleansing regime. .         4. Assessment Body mass index (BMI) 27.0-27.9, adult (U05.42). Plan Orders Today's instructions / counseling include(s) Lifestyle education regarding diet. 5. Assessment Encounter for other preprocedural examination (Z01.818). Impression Patient scheduled for an Exam under Anesthesia with Dr. Cristiane Chen. .    Patient Plan Expectations of preoperative /perioperative/postoperative/treatment course were also discussed. Literature was given to the patient and reviewed. The patient showed understanding and agreed with the plan as above. Extended time was taken to answer all patient questions. Extra pre-op paperwork and counseling completed. Provider Plan Review with DR. Cristiane Chen. This 45year old female presents for Anorectal pain, Hemorrhoids, Referral from: Dr. Denny Gee and GI. History of Present Illness  1. Anorectal pain   2. Hemorrhoids   3. Referral from: Dr. Denny Gee, GI       Comments: Patient presents today with a referral from Dr. Vaishali Mendes, GI d/t Hemorrhoids (H/O hemorrhoid surgery, hard black nodule in rectum - likely a suture with blood clot, please confirm). Patient states she had a hemorrhoidectomy before 2012, unable to locate surgical information. She was DX'd with Hemorrhoids by Dr. Angélica Hicks and has anorectal complaints such as: protrusion, painful inflammation, large, some rectal itching, some straining with BM's, constipation sometimes, and fecal urgency r/t exercise. She denies rectal bleeding but admits that her stools are large in amount and regular. She has a HX of giving birth to 2 children, one of which was over 10 lbs and tearing of the perineum.  She also admits to occasional urinary urgency with near misses and found PF/PT to be successful with pelvic floor issues. She does not take laxatives at this time. She used anusol for rectal pain and finds that it is useful. Chart Review: Flexible Sigmoidoscopy on 22 with Dr. Melo GARVIN (patient has a HX of polyps), Pathology shows that polyps removed were benign, return in 5 years. EGD: Benign gastric Polyp - no further interventions. Labs: 22  Personal HX reviewed  Medications reviewed. Problem List  Problem List reviewed. Past Medical/Surgical History   (Detailed)  Disease/disorder Onset Date Management Date Comments   (7) Colon Polyps- 2018      Asthma       Fatty Liver       Hemorrhoids         Hysterectomy 2018       Achilles Tendon 1995       Jaw        L Leg Varicose Vein 2019       Left Ear Stapedectomy 2003       Hemorrhoidectomy       Flexible sigmoidoscopy         Family History   (Detailed)    Relationship Family Member Name  Age at Death Condition Onset Age Cause of Death   Mother    Hypertension  N   Mother    Diabetes mellitus  N   Sister    Cancer, breast  N     Social History  (Detailed)  Tobacco use reviewed. Preferred language is [place]. Marital Status/Family/Social Support  Marital status:      Tobacco use status: Current non-smoker. Smoking status: Never smoker. Tobacco Screening  Patient has never used tobacco. Patient has not used tobacco in the last 30 days. Patient has not used smokeless tobacco in the last 30 days. Smoking Status  Type Smoking Status Usage Per Day Years Used Pack Years Total Pack Years    Never smoker         Tobacco/Vaping Exposure  No passive vaping exposure. No passive smoke exposure. Alcohol  There is a history of alcohol use. consumed rarely.       Medications (active prior to today)  Medication Instructions Start Date Stop Date Refilled Elsewhere   valacyclovir 500 mg tablet take 2 tablet by ORAL route  every day 2021   N   ProAir HFA 90 mcg/actuation aerosol inhaler inhale 2 puff by inhalation route  every 4 - 6 hours as needed 02/17/2021   N   Anusol-HC 2.5 % topical cream with perineal applicator apply by topical route 2- 4 times every day a thin layer to the affected area(s) 07/21/2021 07/28/2022 07/28/2022 N   ibuprofen 800 mg tablet take 1 tablet by oral route 3 times every day with food 07/21/2021   N   Imvexxy Maintenance Pack 10 mcg vaginal insert insert 1 vaginal insert by vaginal route 2 times every week (every 3 or 4 days) 11/16/2021 11/16/2021 N   Singulair 10 mg tablet take 1 tablet by oral route  every day in the evening 11/16/2021 11/16/2021 N   Climara 0.05 mg/24 hr transdermal patch apply 1 patch by transdermal route  every week cyclically, 37/19/7584 11/17/2021 N   Diflucan 150 mg tablet take 1 tablet by oral route once may repeat in 3 days if not improving 01/31/2022 01/31/2022 N   rosuvastatin 10 mg tablet take 1 tablet by oral route  every day 02/21/2022 02/21/2022 N   cyclobenzaprine 10 mg tablet take 1 tablet by oral route 3 times every day 02/21/2022 02/21/2022 N   multivitamin 1 po qd //   Y   Vitamin D3 125 mcg (5,000 unit) tablet 1 po qd //   Y     Patient Status   Completed with information received for patient transitioning into care. Medication Reconciliation  Medications reconciled today.     Medication Reviewed  Adherence Medication Name Sig Desc Elsewhere Status   taking as directed ProAir HFA 90 mcg/actuation aerosol inhaler inhale 2 puff by inhalation route  every 4 - 6 hours as needed N Verified   taking as directed valacyclovir 500 mg tablet take 2 tablet by ORAL route  every day N Verified   taking as directed Anusol-HC 2.5 % topical cream with perineal applicator apply by topical route 2- 4 times every day a thin layer to the affected area(s) N Verified   taking as directed ibuprofen 800 mg tablet take 1 tablet by oral route 3 times every day with food N Verified   taking as directed Imvexxy Maintenance Pack 10 mcg vaginal insert insert 1 vaginal insert by vaginal route 2 times every week (every 3 or 4 days) N Verified   taking as directed Climara 0.05 mg/24 hr transdermal patch apply 1 patch by transdermal route  every week cyclically, N Verified   taking as directed Singulair 10 mg tablet take 1 tablet by oral route  every day in the evening N Verified   taking as directed Diflucan 150 mg tablet take 1 tablet by oral route once may repeat in 3 days if not improving N Verified   taking as directed cyclobenzaprine 10 mg tablet take 1 tablet by oral route 3 times every day N Verified   taking as directed multivitamin 1 po qd Y Verified   taking as directed rosuvastatin 10 mg tablet take 1 tablet by oral route  every day N Verified   taking as directed Vitamin D3 125 mcg (5,000 unit) tablet 1 po qd Y Verified     Allergies  Ingredient Reaction (Severity) Medication Name Comment   NO KNOWN ALLERGIES        Reviewed, no changes. Review of Systems  System Neg/Pos Details   Constitutional Negative Chills, Fever, Night sweats and Weight loss. ENMT Negative Ear drainage, Hearing loss and Otalgia. Eyes Negative Vision changes. Respiratory Negative Cough, Dyspnea and Known TB exposure. Cardio Negative Chest pain and Claudication. GI Negative Abdominal pain, Constipation, Diarrhea, Nausea and Vomiting.  Negative Dysuria and Hematuria. Endocrine Negative Cold intolerance and Heat intolerance. Neuro Negative Gait disturbance, Headache and Seizures. Psych Negative Insomnia and Impaired judgement. Integumentary Negative Change in shape/size of mole(s) and Skin lesion. MS Negative Joint swelling and Muscle weakness. Hema/Lymph Negative Easy bleeding and Easy bruising. Allergic/Immuno Negative Contact allergy and Environmental allergies. Reproductive Negative Breast discharge, Dysmenorrhea and Vaginal discharge.        Vital Signs   Height  Time ft in cm Last Measured Height Position   3:18 PM 5.0 5.00 165.10 07/28/2022 0 Weight/BSA/BMI  Time lb oz kg Context BMI kg/m2 BSA m2   3:18 .00  76.204 dressed with shoes 27.96      Blood Pressure  Time BP mm/Hg Position Side Site Method Cuff Size   3:18 /76 sitting left arm manual adult     Temperature/Pulse/Respiration  Time Temp F Temp C Temp Site Pulse/min Pattern Resp/ min   3:18 PM    64 regular      Pulse Oximetry/FIO2  Time Pulse Ox (Rest %) Pulse Ox (Amb %) O2 Sat O2 L/Min Timing FiO2 % L/min Delivery Method Finger Probe   3:18 PM 98  RA      R Index     Pain Scale  Time Pain Score Method   3:18 PM 0/10 Numeric Pain Intensity Scale     Measured by  Time Measured by   3:18 PM Jose [employer]uel Millet     Physical Exam  Exam Findings Details   Constitutional * Overall appearance - well developed, 3 vital signs seen above. Eyes Normal Conjunctiva - Right: Normal, Left: Normal. Pupil - Right: Normal, Left: Normal.   Ears Normal Inspection - Right: Normal, Left: Normal.   Nose/Mouth/Throat Normal External nose - Normal. Lips/teeth/gums - Normal.   Neck Exam Normal Inspection - Normal.   Respiratory Normal Inspection - Normal. Effort - Normal.   Vascular Normal Capillary refill - Less than 2 seconds. Abdomen * Inspection - surgical scar(s). Abdomen Normal No abdominal tenderness. No hepatic enlargement. No spleen enlargement. No hernia. Genitourinary Normal External genitalia - Normal.   Rectal * Perianal area - Findings: excoriation. Sphincter - Findings: poor tone. Rectal walls - Findings: internal hemorrhoids, Location: three quadrants, Description: grade ll. Rectal Comments Pt in prone jackknife: COLE: Pelvic floor muscle coordination eval performed. Good anal tone. Push and squeeze intact. Normal decent. Appropriate coordination of anal floor for defecation. No evidence of non-relaxing puborectalis.  No internal or complete procidentia   Rectal Normal Anus - Normal. Muscular ring - Normal. Deep palpation - Normal. Fecal material - Normal. Fecal occult blood test - Not indicated. Skin Normal Inspection - Normal.   Musculoskeletal Normal Visual overview of all four extremities is normal.   Extremity Normal No edema. Neurological Normal Memory - Normal. Cranial nerves - Cranial nerves II through XII grossly intact. Psychiatric Normal Orientation - Oriented to time, place, person & situation. Appropriate mood and affect. Normal insight. Normal judgment.      Immunizations Entered by History  Date Immunization   11/22/2021 12:00:00 AM Influenza, seasonal, injectable, preservative free   11/22/2021 12:00:00 AM SARS-COV-2 (COVID-19) vaccine, mRNA, spike protein, LNP, preservative free, 30 mcg/0.3mL dose   4/16/2021 12:00:00 AM SARS-COV-2 (COVID-19) vaccine, mRNA, spike protein, LNP, preservative free, 30 mcg/0.3mL dose   3/23/2021 12:00:00 AM SARS-COV-2 (COVID-19) vaccine, mRNA, spike protein, LNP, preservative free, 30 mcg/0.3mL dose         Medications (added, continued, or stopped this visit)  Start Date Medication Directions PRN Status PRN Reason Instruction Stop Date   07/21/2021 Anusol-HC 2.5 % topical cream with perineal applicator apply by topical route 2- 4 times every day a thin layer to the affected area(s) N   07/28/2022 07/28/2022 Anusol-HC 2.5 % topical cream with perineal applicator apply by topical route 2- 4 times every day a thin layer to the affected area(s) as needed for rectal pain Y rectal pain     11/17/2021 Climara 0.05 mg/24 hr transdermal patch apply 1 patch by transdermal route  every week cyclically, N      86/19/7749 cyclobenzaprine 10 mg tablet take 1 tablet by oral route 3 times every day N      01/31/2022 Diflucan 150 mg tablet take 1 tablet by oral route once may repeat in 3 days if not improving N      07/21/2021 ibuprofen 800 mg tablet take 1 tablet by oral route 3 times every day with food N      11/16/2021 Imvexxy Maintenance Pack 10 mcg vaginal insert insert 1 vaginal insert by vaginal route 2 times every week (every 3 or 4 days) N multivitamin 1 po qd N      02/17/2021 ProAir HFA 90 mcg/actuation aerosol inhaler inhale 2 puff by inhalation route  every 4 - 6 hours as needed N  inhaler    02/21/2022 rosuvastatin 10 mg tablet take 1 tablet by oral route  every day N      11/16/2021 Singulair 10 mg tablet take 1 tablet by oral route  every day in the evening N      02/17/2021 valacyclovir 500 mg tablet take 2 tablet by ORAL route  every day N       Vitamin D3 125 mcg (5,000 unit) tablet 1 po qd N        Active Patient Care Team Members  Name Contact Agency Type Support Role Relationship Active Date Inactive Date Specialty   Sal Roberto   encounter provider    Gastroenterology   Rachel Foster   Patient provider PCP   Eric 3   encounter provider    Gastroenterology   Mariposa Borden

## 2022-10-31 NOTE — DISCHARGE INSTRUCTIONS
Post-Operative Discharge Instructions  Janett Minor DO, [address], [facility] [place], [place] [facility]  [address] [place] [address],  Carla Reid  [phone]    Patient: Sarah Beth Rollins MRN: [id number]  CSN: [id number]    YOB: 1970  Age: 45 y.o. Sex: female    DOA: 10/31/2022 LOS: [unfilled]  Discharge Date: [unfilled]     Acute Diagnoses:  RECTAL NODULE    Chronic Medical Diagnoses:  [unfilled]      IMPORTANT Instructions for Patients Having   Hemorrhoid or other Anal Surgery    After Surgery:     A gelfoam plug is in the anus for post op bleeding. It can be removed later today with first BM or in bathtub. This can be a PAINFUL operation. Use the narcotics if prescribed for the first day or so. They are very constipating. Take senokot-S or miralax 1-2x a day with a large, extra glass of water to soften the stool. ICE to area for swelling. place GAUZE between buttocks at anus 3x/day to absorb moisture. Maxi pads do NOT work - only protect clothes not skin. Begin Sitz baths by soaking in a warm to hot bathtub the night of surgery and 3-4 times per day thereafter. You may eat anything you want. Expect a small amount of bleeding (several tablespoons). If the bleeding is excessive, call the office. You may drive and resume your normal activities 24-48 hours after surgery if you are not taking prescription pain-killers or muscle relaxants. If your bowels do not move in 2-3 days, use magnesium citrate. Take one bottle and repeat another bottle if no effect in 4-6hrs. Call the office if you have any problems or questions. Your post op appointment will be 4 weeks after surgery if needed. (no post op appt for botox or RBL unless problems)    Medications  1) Valium for rectal spasm  2) Calmoseptine paste to protect skin from moisture or irritation. Over the counter. 3) Balneol (anal cleansing lotion) to clean anus - avoid baby wipes and preparation H/Tucks products.  Over the counter  4) Laxative such as senokot-S, Miralax, Milk of Magnesia - start day 1 and hold for diarrhea  5) Ibuprofen 800mg every 8 hrs around the clock. 6) Topical lidocaine 5% ointment (frequently not covered by insurance. If not, get over the counter e.g. RECTICARE- 29$)           DISCHARGE SUMMARY from Nurse    PATIENT INSTRUCTIONS:    After general anesthesia or intravenous sedation, for 24 hours or while taking prescription Narcotics:  Limit your activities  Do not drive and operate hazardous machinery  Do not make important personal or business decisions  Do  not drink alcoholic beverages  If you have not urinated within 8 hours after discharge, please contact your surgeon on call. Report the following to your surgeon:  Excessive pain, swelling, redness or odor of or around the surgical area  Temperature over 100.5  Nausea and vomiting lasting longer than 4 hours or if unable to take medications  Any signs of decreased circulation or nerve impairment to extremity: change in color, persistent  numbness, tingling, coldness or increase pain  Any questions    What to do at Home:  Recommended activity: Ambulate in house and No lifting, Driving, or Strenuous exercise today,     If you experience any of the following symptoms above, please follow up with Dr. Ruchi Mc. *  Please give a list of your current medications to your Primary Care Provider. *  Please update this list whenever your medications are discontinued, doses are      changed, or new medications (including over-the-counter products) are added. *  Please carry medication information at all times in case of emergency situations. These are general instructions for a healthy lifestyle:    No smoking/ No tobacco products/ Avoid exposure to second hand smoke  Surgeon General's Warning:  Quitting smoking now greatly reduces serious risk to your health.     Obesity, smoking, and sedentary lifestyle greatly increases your risk for illness    A healthy diet, regular physical exercise & weight monitoring are important for maintaining a healthy lifestyle    You may be retaining fluid if you have a history of heart failure or if you experience any of the following symptoms:  Weight gain of 3 pounds or more overnight or 5 pounds in a week, increased swelling in our hands or feet or shortness of breath while lying flat in bed. Please call your doctor as soon as you notice any of these symptoms; do not wait until your next office visit. Patient armband removed and shredded     The discharge information has been reviewed with the patient and caregiver. The patient and caregiver verbalized understanding. Discharge medications reviewed with the patient and caregiver and appropriate educational materials and side effects teaching were provided.   ___________________________________________________________________________________________________________________________________

## 2022-10-31 NOTE — PERIOP NOTES
Reviewed PTA medication list with patient/caregiver and patient/caregiver denies any additional medications. Patient admits to having a responsible adult care for them at home for at least 24 hours after surgery. Patient encouraged to use gown warming system and informed that using said warming gown to regulate body temperature prior to a procedure has been shown to help reduce the risks of blood clots and infection. Patient's pharmacy of choice verified and documented in PTA medication section. Dual skin assessment & fall risk band verification completed with Rich Tong RN.

## 2022-10-31 NOTE — OP NOTES
OPERATIVE NOTE    Patient: Shelia Hogan MRN: [id number]  SSN: [id number]    YOB: 1970  Age: 45 y.o. Sex: female      Indications: This is a 45y.o. year-old female who presents with black rectal nodule found on colonoscopy. The patient was admitted for surgery she has concerns and wants it removed. Date of Procedure: 10/31/2022     Preoperative Diagnosis: RECTAL NODULE    Postoperative Diagnosis: RECTAL NODULE      Procedure: Procedure(s):  TRANSANAL EXCISION, PARTIAL THICKNESS OF RECTAL NODULE MAC + LOCAL ANESTHESIA    Surgeon(s): Surgeon(s) and Role:     Shelia Stockton, DO - Primary    Assistant(s): Circ-1: Danita Mackay RN  Circ-Relief: Zeynep Perez RN  Scrub Tech-1: Vannesa Serrato  Surg Asst-1: Rafael Guajardo    Anesthesia: MAC     Procedure: The patient prepped preprocedure with a fleets enema. Pt was counseled on the risks of the procedure including suboptimal/no effect, recurrence, bleeding, infection, wound healing problems, injury to local neurovascular structures, urinary retention requiring intervention, thrombosed external hemorrhoids, skin irritation, gas or fecal incontinence, anal stenosis, severe chronic pain, and allergic or adverse reaction to medications. After obtaining informed consent and properly identifying the patient and area of focus the  patient expressed understanding and signed consent. The patient was then taken to the operating room and was placed in prone jackknife position. All pressure points were padded. A time out was performed. The patients perineum was prepped and draped in the standard surgical fashion. MAC sedation and an anal block using 2% lidocaine and .5% Marcaine 50:50 solution was administered. A complete COLE and anal exam was performed and gr 2-3 internal hemorrhoids were found but no other abnormalities.   A lubricated Nelson retractor was placed in the anus and under direct visualization the lesion in question was found in the RPL location approximately 3-4 cm from the dentate line. It was resected through the submucosa exposing the muscular and vascular layer. Hemostasis was achieved with cautery and the defect was closed using a 3-0 vicryl suture. Surgicel over a gelfoam was placed. The patient tolerated the procedure well. Post procedure instructions and meds were given. Estimated Blood Loss: Minimal    Findings: RPL rectal nodule with black FB 3-4 cm above the dentate line   grade 2 internal hemorrhoids in 3 columns    Specimens: none     Drains: none  Implants: none    Complications: None; patient tolerated the procedure well.     Bashir Leiva DO  10/31/2022  12:23 PM

## 2022-10-31 NOTE — ANESTHESIA PREPROCEDURE EVALUATION
Relevant Problems   No relevant active problems       Anesthetic History     PONV          Review of Systems / Medical History  Patient summary reviewed, nursing notes reviewed and pertinent labs reviewed    Pulmonary            Asthma : well controlled       Neuro/Psych   Within defined limits           Cardiovascular  Within defined limits                Exercise tolerance: >4 METS     GI/Hepatic/Renal     GERD: well controlled      Liver disease     Endo/Other  Within defined limits           Other Findings              Physical Exam    Airway  Mallampati: I  TM Distance: > 6 cm  Neck ROM: normal range of motion   Mouth opening: Normal     Cardiovascular  Regular rate and rhythm,  S1 and S2 normal,  no murmur, click, rub, or gallop  Rhythm: regular  Rate: normal         Dental    Dentition: Caps/crowns     Pulmonary  Breath sounds clear to auscultation               Abdominal  GI exam deferred       Other Findings            Anesthetic Plan    ASA: 2  Anesthesia type: MAC and total IV anesthesia          Induction: Intravenous  Anesthetic plan and risks discussed with: Patient

## 2022-10-31 NOTE — DISCHARGE SUMMARY
Discharge Summary    Patient: Sloan Haynes MRN: [id number]  CSN: [id number]    YOB: 1970  Age: 45 y.o. Sex: female    DOA: 10/31/2022 LOS:  LOS: 0 days   Discharge Date:      Admission Diagnoses: RECTAL NODULE    Discharge Diagnoses:  RECTAL NODULE    Discharge Condition: Good    Discharge Disposition: Home    Procedure: Procedure(s):  TRANSANAL EXCISION, PARTIAL THICKNESS OF RECTAL NODULE MAC + LOCAL ANESTHESIA    Surgeon(s): Surgeon(s) and Role:     Nikhil Davila, [facility] Course:  Uncomplicated. Upon discharge the patient was ambulating, urinating, having bowel movements, and tolerating a diet with pain well controlled. It is expected that the patient continues to improve upon discharge. Consults: None    Significant Diagnostic Studies: none    Discharge Medications:     Current Discharge Medication List        START taking these medications    Details   diazePAM (VALIUM) 5 mg tablet Take 1 Tablet by mouth every eight (8) hours as needed (For RECTAL SPASM). Max Daily Amount: 15 mg.  Qty: 6 Tablet, Refills: 0    Associated Diagnoses: Rectal polyp           CONTINUE these medications which have NOT CHANGED    Details   esomeprazole (NexIUM) 20 mg capsule Take  by mouth daily. acetaminophen (TYLENOL) 500 mg tablet Take 500 mg by mouth every six (6) hours as needed for Pain.      !! OTHER Estrogen patch once  a week      !! OTHER Estrogen vaginal suppostory  once a week      cyanocobalamin 1,000 mcg tablet Take 1,000 mcg by mouth daily. ibuprofen (MOTRIN) 200 mg tablet Take 200 mg by mouth as needed for Pain. cyclobenzaprine (FLEXERIL) 10 mg tablet Take 10 mg by mouth three (3) times daily as needed for Muscle Spasm(s). rosuvastatin (CRESTOR) 10 mg tablet Take 10 mg by mouth nightly. montelukast (SINGULAIR) 10 mg tablet Take 10 mg by mouth daily. fexofenadine (ALLEGRA) 180 mg tablet Take 180 mg by mouth daily as needed for Allergies. albuterol (PROVENTIL HFA, VENTOLIN HFA, PROAIR HFA) 90 mcg/actuation inhaler Take 2 Puffs by inhalation every six (6) hours as needed for Wheezing. cholecalciferol (VITAMIN D3) (5000 Units/125 mcg) tab tablet Take 5,000 Units by mouth daily. !! - Potential duplicate medications found. Please discuss with provider.           Activity: Activity as tolerated and no driving for today, No driving while on analgesics, No heavy lifting for 2 weeks, and See surgical instructions    Diet: Regular Diet    Wound Care: Keep wound clean and dry, Ice to area for comfort, and As directed    Follow-up: 4 weeks

## 2023-04-28 ENCOUNTER — HOSPITAL ENCOUNTER (EMERGENCY)
Facility: HOSPITAL | Age: 53
Discharge: HOME | End: 2023-04-28
Attending: EMERGENCY MEDICINE
Payer: OTHER GOVERNMENT

## 2023-04-28 ENCOUNTER — APPOINTMENT (OUTPATIENT)
Facility: HOSPITAL | Age: 53
End: 2023-04-28
Payer: OTHER GOVERNMENT

## 2023-04-28 VITALS
TEMPERATURE: 97.9 F | DIASTOLIC BLOOD PRESSURE: 79 MMHG | RESPIRATION RATE: 20 BRPM | BODY MASS INDEX: 29.66 KG/M2 | SYSTOLIC BLOOD PRESSURE: 149 MMHG | WEIGHT: 178 LBS | OXYGEN SATURATION: 100 % | HEIGHT: 65 IN | HEART RATE: 85 BPM

## 2023-04-28 DIAGNOSIS — F41.1 ANXIETY STATE: ICD-10-CM

## 2023-04-28 DIAGNOSIS — R06.02 SHORTNESS OF BREATH: Primary | ICD-10-CM

## 2023-04-28 LAB
ALBUMIN SERPL-MCNC: 4.6 G/DL (ref 3.4–5)
ALBUMIN/GLOB SERPL: 1.2 (ref 0.8–1.7)
ALP SERPL-CCNC: 60 U/L (ref 45–117)
ALT SERPL-CCNC: 39 U/L (ref 13–56)
ANION GAP SERPL CALC-SCNC: 11 MMOL/L (ref 3–18)
ARTERIAL PATENCY WRIST A: POSITIVE
AST SERPL-CCNC: 20 U/L (ref 10–38)
B PERT DNA SPEC QL NAA+PROBE: NOT DETECTED
BASE DEFICIT BLD-SCNC: 1.7 MMOL/L
BASOPHILS # BLD: 0 K/UL (ref 0–0.1)
BASOPHILS NFR BLD: 0 % (ref 0–2)
BDY SITE: ABNORMAL
BILIRUB SERPL-MCNC: 0.5 MG/DL (ref 0.2–1)
BORDETELLA PARAPERTUSSIS BY PCR: NOT DETECTED
BUN SERPL-MCNC: 23 MG/DL (ref 7–18)
BUN/CREAT SERPL: 19 (ref 12–20)
C PNEUM DNA SPEC QL NAA+PROBE: NOT DETECTED
CALCIUM SERPL-MCNC: 10.2 MG/DL (ref 8.5–10.1)
CHLORIDE SERPL-SCNC: 110 MMOL/L (ref 100–111)
CO2 SERPL-SCNC: 19 MMOL/L (ref 21–32)
CREAT SERPL-MCNC: 1.2 MG/DL (ref 0.6–1.3)
DIFFERENTIAL METHOD BLD: ABNORMAL
EOSINOPHIL # BLD: 0 K/UL (ref 0–0.4)
EOSINOPHIL NFR BLD: 0 % (ref 0–5)
ERYTHROCYTE [DISTWIDTH] IN BLOOD BY AUTOMATED COUNT: 12.4 % (ref 11.6–14.5)
FLUAV RNA SPEC QL NAA+PROBE: NOT DETECTED
FLUAV SUBTYP SPEC NAA+PROBE: NOT DETECTED
FLUBV RNA SPEC QL NAA+PROBE: NOT DETECTED
FLUBV RNA SPEC QL NAA+PROBE: NOT DETECTED
GAS FLOW.O2 O2 DELIVERY SYS: ABNORMAL
GLOBULIN SER CALC-MCNC: 3.8 G/DL (ref 2–4)
GLUCOSE SERPL-MCNC: 149 MG/DL (ref 74–99)
HADV DNA SPEC QL NAA+PROBE: NOT DETECTED
HCO3 BLD-SCNC: 22.2 MMOL/L (ref 22–26)
HCOV 229E RNA SPEC QL NAA+PROBE: NOT DETECTED
HCOV HKU1 RNA SPEC QL NAA+PROBE: NOT DETECTED
HCOV NL63 RNA SPEC QL NAA+PROBE: NOT DETECTED
HCOV OC43 RNA SPEC QL NAA+PROBE: NOT DETECTED
HCT VFR BLD AUTO: 45.4 % (ref 35–45)
HGB BLD-MCNC: 15.2 G/DL (ref 12–16)
HMPV RNA SPEC QL NAA+PROBE: NOT DETECTED
HPIV1 RNA SPEC QL NAA+PROBE: NOT DETECTED
HPIV2 RNA SPEC QL NAA+PROBE: NOT DETECTED
HPIV3 RNA SPEC QL NAA+PROBE: NOT DETECTED
HPIV4 RNA SPEC QL NAA+PROBE: NOT DETECTED
IMM GRANULOCYTES # BLD AUTO: 0.1 K/UL (ref 0–0.04)
IMM GRANULOCYTES NFR BLD AUTO: 1 % (ref 0–0.5)
LYMPHOCYTES # BLD: 1.3 K/UL (ref 0.9–3.6)
LYMPHOCYTES NFR BLD: 10 % (ref 21–52)
M PNEUMO DNA SPEC QL NAA+PROBE: NOT DETECTED
MAGNESIUM SERPL-MCNC: 2.3 MG/DL (ref 1.6–2.6)
MCH RBC QN AUTO: 30.1 PG (ref 24–34)
MCHC RBC AUTO-ENTMCNC: 33.5 G/DL (ref 31–37)
MCV RBC AUTO: 89.9 FL (ref 78–100)
MONOCYTES # BLD: 0.2 K/UL (ref 0.05–1.2)
MONOCYTES NFR BLD: 2 % (ref 3–10)
NEUTS SEG # BLD: 11.6 K/UL (ref 1.8–8)
NEUTS SEG NFR BLD: 87 % (ref 40–73)
NRBC # BLD: 0 K/UL (ref 0–0.01)
NRBC BLD-RTO: 0 PER 100 WBC
NT PRO BNP: 76 PG/ML (ref 0–900)
PCO2 BLD: 34.3 MMHG (ref 35–45)
PH BLD: 7.42 (ref 7.35–7.45)
PLATELET # BLD AUTO: 377 K/UL (ref 135–420)
PMV BLD AUTO: 9.7 FL (ref 9.2–11.8)
PO2 BLD: 64 MMHG (ref 80–100)
POTASSIUM SERPL-SCNC: 3.9 MMOL/L (ref 3.5–5.5)
PROT SERPL-MCNC: 8.4 G/DL (ref 6.4–8.2)
RBC # BLD AUTO: 5.05 M/UL (ref 4.2–5.3)
RSV RNA SPEC QL NAA+PROBE: NOT DETECTED
RV+EV RNA SPEC QL NAA+PROBE: NOT DETECTED
SAO2 % BLD: 92.7 % (ref 92–97)
SARS-COV-2 RNA RESP QL NAA+PROBE: NOT DETECTED
SARS-COV-2 RNA RESP QL NAA+PROBE: NOT DETECTED
SERVICE CMNT-IMP: ABNORMAL
SODIUM SERPL-SCNC: 140 MMOL/L (ref 136–145)
SPECIMEN TYPE: ABNORMAL
TROPONIN I SERPL HS-MCNC: 6 NG/L (ref 0–54)
TROPONIN I SERPL HS-MCNC: 8 NG/L (ref 0–54)
WBC # BLD AUTO: 13.2 K/UL (ref 4.6–13.2)

## 2023-04-28 PROCEDURE — 2580000003 HC RX 258: Performed by: PHYSICIAN ASSISTANT

## 2023-04-28 PROCEDURE — 36600 WITHDRAWAL OF ARTERIAL BLOOD: CPT

## 2023-04-28 PROCEDURE — 84484 ASSAY OF TROPONIN QUANT: CPT

## 2023-04-28 PROCEDURE — 6360000002 HC RX W HCPCS: Performed by: PHYSICIAN ASSISTANT

## 2023-04-28 PROCEDURE — 85025 COMPLETE CBC W/AUTO DIFF WBC: CPT

## 2023-04-28 PROCEDURE — 96374 THER/PROPH/DIAG INJ IV PUSH: CPT

## 2023-04-28 PROCEDURE — 82803 BLOOD GASES ANY COMBINATION: CPT

## 2023-04-28 PROCEDURE — 71045 X-RAY EXAM CHEST 1 VIEW: CPT

## 2023-04-28 PROCEDURE — 83735 ASSAY OF MAGNESIUM: CPT

## 2023-04-28 PROCEDURE — 93005 ELECTROCARDIOGRAM TRACING: CPT | Performed by: PHYSICIAN ASSISTANT

## 2023-04-28 PROCEDURE — 87636 SARSCOV2 & INF A&B AMP PRB: CPT

## 2023-04-28 PROCEDURE — 0202U NFCT DS 22 TRGT SARS-COV-2: CPT

## 2023-04-28 PROCEDURE — 99285 EMERGENCY DEPT VISIT HI MDM: CPT

## 2023-04-28 PROCEDURE — 80053 COMPREHEN METABOLIC PANEL: CPT

## 2023-04-28 PROCEDURE — 83880 ASSAY OF NATRIURETIC PEPTIDE: CPT

## 2023-04-28 RX ORDER — LORAZEPAM 2 MG/ML
2 INJECTION INTRAMUSCULAR ONCE
Status: COMPLETED | OUTPATIENT
Start: 2023-04-28 | End: 2023-04-28

## 2023-04-28 RX ORDER — LORAZEPAM 1 MG/1
1 TABLET ORAL EVERY 8 HOURS PRN
Qty: 12 TABLET | Refills: 0 | Status: ON HOLD | OUTPATIENT
Start: 2023-04-28 | End: 2023-05-05 | Stop reason: HOSPADM

## 2023-04-28 RX ORDER — IPRATROPIUM BROMIDE AND ALBUTEROL SULFATE 2.5; .5 MG/3ML; MG/3ML
SOLUTION RESPIRATORY (INHALATION)
Status: DISCONTINUED
Start: 2023-04-28 | End: 2023-04-28 | Stop reason: HOSPADM

## 2023-04-28 RX ORDER — SODIUM CHLORIDE, SODIUM LACTATE, POTASSIUM CHLORIDE, AND CALCIUM CHLORIDE .6; .31; .03; .02 G/100ML; G/100ML; G/100ML; G/100ML
1000 INJECTION, SOLUTION INTRAVENOUS
Status: COMPLETED | OUTPATIENT
Start: 2023-04-28 | End: 2023-04-28

## 2023-04-28 RX ADMIN — SODIUM CHLORIDE, POTASSIUM CHLORIDE, SODIUM LACTATE AND CALCIUM CHLORIDE 1000 ML: 600; 310; 30; 20 INJECTION, SOLUTION INTRAVENOUS at 14:39

## 2023-04-28 RX ADMIN — LORAZEPAM 2 MG: 2 INJECTION INTRAMUSCULAR; INTRAVENOUS at 14:40

## 2023-04-28 ASSESSMENT — PAIN SCALES - GENERAL: PAINLEVEL_OUTOF10: 0

## 2023-04-28 ASSESSMENT — PAIN - FUNCTIONAL ASSESSMENT: PAIN_FUNCTIONAL_ASSESSMENT: 0-10

## 2023-04-28 NOTE — ED TRIAGE NOTES
Pt arrives to ED with c\o wheezing x 1 day, pt sts she was seen at [facility] ed yesterday for same, pt hyperventilating in triage, able to regulate breathing with coaching, pt is able to make needs known speaking in complete sentences, pt in nad at this time

## 2023-04-28 NOTE — ED PROVIDER NOTES
[facility] EMERGENCY DEPT  EMERGENCY DEPARTMENT ENCOUNTER       Pt Name: Moose Dockery  MRN: [id number]  [place] 1970  Date of evaluation: 4/28/2023  PCP: Cecilia Padron MD  Note Started: 2:33 AM 4/28/23     CHIEF COMPLAINT       Chief Complaint   Patient presents with    Wheezing        HISTORY OF PRESENT ILLNESS: 1 or more elements      History From: Patient  HPI Limitations: None  Chronic Conditions: anxeity, asthma, seasonal allergies, chronic pain, GERD  Social Determinants affecting Dx or Tx: none      Moose Dockery is a 52 y.o. female who presents to ED with  c/o SOB. Pt notes episodes of SOB intermittent x 2-3 days. Pt notes wheezing at home. Pt developed cough and cold like sxs 2 weeks ago after pt's  returned from [place] with respiratory illness. Pt hyperventilating per arrival in ED. She notes she feels she can take in air well but has difficulty breathing out. Sxs started today after walking up and downs stairs at home. Pt was seen at [facility] on 04/26/2023 for same. Pt had extensive workup including negative CTA chest, labs, duo nebs, steroids (taking currently), and IV Mg. Pt had ABG performed at [facility] which revealed hyperventilaition with metabolic compensation. Pt is nonsmoker. Pt has hx of asthma. No cardiac hx or hx of PE/DVT. CTA CHEST w/wo contrast 04/26/2023 at [facility] ED  There is no evidence of pulmonary embolism     Digital Imaging and Communications in Medicine (DICOM) format image data are available to nonaffiliated external healthcare facilities or entities on a secure, media free, reciprocally searchable basis with patient authorization for at least a 12-month period after this study. Nursing Notes were all reviewed and agreed with or any disagreements were addressed in the HPI.     PAST HISTORY     Past Medical History:  Past Medical History:   Diagnosis Date    Anxiety     Asthma     seasonal allegies uses inhaler    Chronic pain     lower pain    GERD

## 2023-04-28 NOTE — ED NOTES
Patient  approached Nursing station stated \" My wife needs help she can't breath \". Patient SPO2 100% patient taking fast shallow breaths. Patient states \" I don't know what's happing please don't let me die\"  no wheezing was observed during respiratory assessment. Patient continued to c/o of not being able to moving air and very anxious.  Duo-neb started and completed patient report some improvement  all Vital signs WNL     Imani Fair RN  04/28/23 0691

## 2023-04-30 LAB
EKG ATRIAL RATE: 74 BPM
EKG DIAGNOSIS: NORMAL
EKG P AXIS: 48 DEGREES
EKG P-R INTERVAL: 142 MS
EKG Q-T INTERVAL: 384 MS
EKG QRS DURATION: 92 MS
EKG QTC CALCULATION (BAZETT): 426 MS
EKG R AXIS: -18 DEGREES
EKG T AXIS: -31 DEGREES
EKG VENTRICULAR RATE: 74 BPM

## 2023-05-03 ENCOUNTER — APPOINTMENT (OUTPATIENT)
Facility: HOSPITAL | Age: 53
DRG: 202 | End: 2023-05-03
Payer: OTHER GOVERNMENT

## 2023-05-03 ENCOUNTER — HOSPITAL ENCOUNTER (INPATIENT)
Facility: HOSPITAL | Age: 53
LOS: 2 days | Discharge: HOME | DRG: 202 | End: 2023-05-05
Attending: EMERGENCY MEDICINE | Admitting: INTERNAL MEDICINE
Payer: OTHER GOVERNMENT

## 2023-05-03 DIAGNOSIS — R06.02 SOB (SHORTNESS OF BREATH): ICD-10-CM

## 2023-05-03 DIAGNOSIS — I87.2 VENOUS INSUFFICIENCY OF BOTH LOWER EXTREMITIES: Primary | ICD-10-CM

## 2023-05-03 DIAGNOSIS — R06.00 DYSPNEA, UNSPECIFIED TYPE: ICD-10-CM

## 2023-05-03 DIAGNOSIS — E87.3 ALKALOSIS: ICD-10-CM

## 2023-05-03 DIAGNOSIS — J45.51 SEVERE PERSISTENT ASTHMA WITH ACUTE EXACERBATION: ICD-10-CM

## 2023-05-03 PROBLEM — K21.9 GASTROESOPHAGEAL REFLUX DISEASE: Status: ACTIVE | Noted: 2019-02-12

## 2023-05-03 PROBLEM — J45.909 ACTIVE ASTHMA: Status: ACTIVE | Noted: 2023-05-03

## 2023-05-03 PROBLEM — R06.82 TACHYPNEA: Status: ACTIVE | Noted: 2023-05-03

## 2023-05-03 LAB
ALBUMIN SERPL-MCNC: 4.3 G/DL (ref 3.4–5)
ALBUMIN/GLOB SERPL: 1.3 (ref 0.8–1.7)
ALP SERPL-CCNC: 56 U/L (ref 45–117)
ALT SERPL-CCNC: 36 U/L (ref 13–56)
ANION GAP SERPL CALC-SCNC: 9 MMOL/L (ref 3–18)
ARTERIAL PATENCY WRIST A: POSITIVE
ARTERIAL PATENCY WRIST A: POSITIVE
AST SERPL-CCNC: 10 U/L (ref 10–38)
BASE DEFICIT BLD-SCNC: 0.6 MMOL/L
BASE DEFICIT BLD-SCNC: 2.3 MMOL/L
BASOPHILS # BLD: 0 K/UL (ref 0–0.1)
BASOPHILS NFR BLD: 0 % (ref 0–2)
BDY SITE: ABNORMAL
BDY SITE: ABNORMAL
BILIRUB SERPL-MCNC: 0.9 MG/DL (ref 0.2–1)
BUN SERPL-MCNC: 14 MG/DL (ref 7–18)
BUN/CREAT SERPL: 13 (ref 12–20)
CALCIUM SERPL-MCNC: 9.8 MG/DL (ref 8.5–10.1)
CHLORIDE SERPL-SCNC: 107 MMOL/L (ref 100–111)
CO2 SERPL-SCNC: 24 MMOL/L (ref 21–32)
CREAT SERPL-MCNC: 1.11 MG/DL (ref 0.6–1.3)
D DIMER PPP FEU-MCNC: 0.29 UG/ML(FEU)
DIFFERENTIAL METHOD BLD: ABNORMAL
EOSINOPHIL # BLD: 0 K/UL (ref 0–0.4)
EOSINOPHIL NFR BLD: 0 % (ref 0–5)
ERYTHROCYTE [DISTWIDTH] IN BLOOD BY AUTOMATED COUNT: 12.7 % (ref 11.6–14.5)
FLUAV RNA SPEC QL NAA+PROBE: NOT DETECTED
FLUBV RNA SPEC QL NAA+PROBE: NOT DETECTED
GAS FLOW.O2 O2 DELIVERY SYS: ABNORMAL
GAS FLOW.O2 O2 DELIVERY SYS: ABNORMAL
GLOBULIN SER CALC-MCNC: 3.4 G/DL (ref 2–4)
GLUCOSE SERPL-MCNC: 149 MG/DL (ref 74–99)
HCO3 BLD-SCNC: 16.8 MMOL/L (ref 22–26)
HCO3 BLD-SCNC: 17 MMOL/L (ref 22–26)
HCT VFR BLD AUTO: 43.5 % (ref 35–45)
HGB BLD-MCNC: 14.5 G/DL (ref 12–16)
IMM GRANULOCYTES # BLD AUTO: 0.1 K/UL (ref 0–0.04)
IMM GRANULOCYTES NFR BLD AUTO: 1 % (ref 0–0.5)
LACTATE BLD-SCNC: 2.22 MMOL/L (ref 0.4–2)
LYMPHOCYTES # BLD: 1 K/UL (ref 0.9–3.6)
LYMPHOCYTES NFR BLD: 10 % (ref 21–52)
MAGNESIUM SERPL-MCNC: 2.3 MG/DL (ref 1.6–2.6)
MCH RBC QN AUTO: 30.1 PG (ref 24–34)
MCHC RBC AUTO-ENTMCNC: 33.3 G/DL (ref 31–37)
MCV RBC AUTO: 90.2 FL (ref 78–100)
MONOCYTES # BLD: 0.2 K/UL (ref 0.05–1.2)
MONOCYTES NFR BLD: 2 % (ref 3–10)
NEUTS SEG # BLD: 8.1 K/UL (ref 1.8–8)
NEUTS SEG NFR BLD: 87 % (ref 40–73)
NRBC # BLD: 0 K/UL (ref 0–0.01)
NRBC BLD-RTO: 0 PER 100 WBC
NT PRO BNP: 52 PG/ML (ref 0–900)
O2/TOTAL GAS SETTING VFR VENT: 21 %
PCO2 BLD: 15.4 MMHG (ref 35–45)
PCO2 BLD: 17.2 MMHG (ref 35–45)
PH BLD: 7.6 (ref 7.35–7.45)
PH BLD: 7.65 (ref 7.35–7.45)
PLATELET # BLD AUTO: 293 K/UL (ref 135–420)
PMV BLD AUTO: 9.5 FL (ref 9.2–11.8)
PO2 BLD: 116 MMHG (ref 80–100)
PO2 BLD: 118 MMHG (ref 80–100)
POTASSIUM SERPL-SCNC: 3.6 MMOL/L (ref 3.5–5.5)
PROT SERPL-MCNC: 7.7 G/DL (ref 6.4–8.2)
RBC # BLD AUTO: 4.82 M/UL (ref 4.2–5.3)
SALICYLATES SERPL-MCNC: <1.7 MG/DL (ref 2.8–20)
SAO2 % BLD: 99.3 % (ref 92–97)
SAO2 % BLD: 99.4 % (ref 92–97)
SARS-COV-2 RNA RESP QL NAA+PROBE: NOT DETECTED
SERVICE CMNT-IMP: ABNORMAL
SERVICE CMNT-IMP: ABNORMAL
SODIUM SERPL-SCNC: 140 MMOL/L (ref 136–145)
SPECIMEN TYPE: ABNORMAL
SPECIMEN TYPE: ABNORMAL
TROPONIN I SERPL HS-MCNC: 4 NG/L (ref 0–54)
WBC # BLD AUTO: 9.3 K/UL (ref 4.6–13.2)

## 2023-05-03 PROCEDURE — 83735 ASSAY OF MAGNESIUM: CPT

## 2023-05-03 PROCEDURE — 96374 THER/PROPH/DIAG INJ IV PUSH: CPT

## 2023-05-03 PROCEDURE — 2000000000 HC ICU R&B

## 2023-05-03 PROCEDURE — 2580000003 HC RX 258: Performed by: PHYSICIAN ASSISTANT

## 2023-05-03 PROCEDURE — 36600 WITHDRAWAL OF ARTERIAL BLOOD: CPT

## 2023-05-03 PROCEDURE — 83605 ASSAY OF LACTIC ACID: CPT

## 2023-05-03 PROCEDURE — 71046 X-RAY EXAM CHEST 2 VIEWS: CPT

## 2023-05-03 PROCEDURE — 6360000002 HC RX W HCPCS: Performed by: PHYSICIAN ASSISTANT

## 2023-05-03 PROCEDURE — 85379 FIBRIN DEGRADATION QUANT: CPT

## 2023-05-03 PROCEDURE — 36415 COLL VENOUS BLD VENIPUNCTURE: CPT

## 2023-05-03 PROCEDURE — 2500000003 HC RX 250 WO HCPCS: Performed by: INTERNAL MEDICINE

## 2023-05-03 PROCEDURE — 83880 ASSAY OF NATRIURETIC PEPTIDE: CPT

## 2023-05-03 PROCEDURE — 2580000003 HC RX 258: Performed by: INTERNAL MEDICINE

## 2023-05-03 PROCEDURE — 96375 TX/PRO/DX INJ NEW DRUG ADDON: CPT

## 2023-05-03 PROCEDURE — 85025 COMPLETE CBC W/AUTO DIFF WBC: CPT

## 2023-05-03 PROCEDURE — 6360000002 HC RX W HCPCS: Performed by: INTERNAL MEDICINE

## 2023-05-03 PROCEDURE — 99285 EMERGENCY DEPT VISIT HI MDM: CPT

## 2023-05-03 PROCEDURE — 71275 CT ANGIOGRAPHY CHEST: CPT

## 2023-05-03 PROCEDURE — 6360000004 HC RX CONTRAST MEDICATION: Performed by: PHYSICIAN ASSISTANT

## 2023-05-03 PROCEDURE — 84484 ASSAY OF TROPONIN QUANT: CPT

## 2023-05-03 PROCEDURE — 80179 DRUG ASSAY SALICYLATE: CPT

## 2023-05-03 PROCEDURE — 82803 BLOOD GASES ANY COMBINATION: CPT

## 2023-05-03 PROCEDURE — 74177 CT ABD & PELVIS W/CONTRAST: CPT

## 2023-05-03 PROCEDURE — 6370000000 HC RX 637 (ALT 250 FOR IP): Performed by: INTERNAL MEDICINE

## 2023-05-03 PROCEDURE — 94640 AIRWAY INHALATION TREATMENT: CPT

## 2023-05-03 PROCEDURE — 87636 SARSCOV2 & INF A&B AMP PRB: CPT

## 2023-05-03 PROCEDURE — 6370000000 HC RX 637 (ALT 250 FOR IP): Performed by: PHYSICIAN ASSISTANT

## 2023-05-03 PROCEDURE — 93005 ELECTROCARDIOGRAM TRACING: CPT | Performed by: EMERGENCY MEDICINE

## 2023-05-03 PROCEDURE — 80053 COMPREHEN METABOLIC PANEL: CPT

## 2023-05-03 RX ORDER — POLYETHYLENE GLYCOL 3350 17 G/17G
17 POWDER, FOR SOLUTION ORAL DAILY PRN
Status: DISCONTINUED | OUTPATIENT
Start: 2023-05-03 | End: 2023-05-05 | Stop reason: HOSPADM

## 2023-05-03 RX ORDER — MAGNESIUM SULFATE IN WATER 40 MG/ML
2000 INJECTION, SOLUTION INTRAVENOUS ONCE
Status: COMPLETED | OUTPATIENT
Start: 2023-05-03 | End: 2023-05-03

## 2023-05-03 RX ORDER — ALBUTEROL SULFATE 2.5 MG/3ML
2.5 SOLUTION RESPIRATORY (INHALATION) 4 TIMES DAILY PRN
Status: DISCONTINUED | OUTPATIENT
Start: 2023-05-03 | End: 2023-05-05 | Stop reason: HOSPADM

## 2023-05-03 RX ORDER — POTASSIUM CHLORIDE 20 MEQ/1
40 TABLET, EXTENDED RELEASE ORAL PRN
Status: DISCONTINUED | OUTPATIENT
Start: 2023-05-03 | End: 2023-05-05 | Stop reason: HOSPADM

## 2023-05-03 RX ORDER — HYDROCODONE POLISTIREX AND CHLORPHENIRAMINE POLISTIREX 10; 8 MG/5ML; MG/5ML
5 SUSPENSION, EXTENDED RELEASE ORAL EVERY 12 HOURS PRN
Status: DISCONTINUED | OUTPATIENT
Start: 2023-05-03 | End: 2023-05-04 | Stop reason: SDUPTHER

## 2023-05-03 RX ORDER — LANOLIN ALCOHOL/MO/W.PET/CERES
1000 CREAM (GRAM) TOPICAL DAILY
Status: DISCONTINUED | OUTPATIENT
Start: 2023-05-04 | End: 2023-05-05 | Stop reason: HOSPADM

## 2023-05-03 RX ORDER — IPRATROPIUM BROMIDE AND ALBUTEROL SULFATE 2.5; .5 MG/3ML; MG/3ML
1 SOLUTION RESPIRATORY (INHALATION)
Status: DISCONTINUED | OUTPATIENT
Start: 2023-05-03 | End: 2023-05-04

## 2023-05-03 RX ORDER — ESOMEPRAZOLE MAGNESIUM 20 MG/1
20 FOR SUSPENSION ORAL DAILY
Status: ON HOLD | COMMUNITY
End: 2023-05-05 | Stop reason: HOSPADM

## 2023-05-03 RX ORDER — ONDANSETRON 2 MG/ML
4 INJECTION INTRAMUSCULAR; INTRAVENOUS EVERY 6 HOURS PRN
Status: DISCONTINUED | OUTPATIENT
Start: 2023-05-03 | End: 2023-05-05 | Stop reason: HOSPADM

## 2023-05-03 RX ORDER — CETIRIZINE HYDROCHLORIDE 10 MG/1
10 TABLET ORAL DAILY
Status: DISCONTINUED | OUTPATIENT
Start: 2023-05-04 | End: 2023-05-05 | Stop reason: HOSPADM

## 2023-05-03 RX ORDER — POTASSIUM CHLORIDE 7.45 MG/ML
10 INJECTION INTRAVENOUS PRN
Status: DISCONTINUED | OUTPATIENT
Start: 2023-05-03 | End: 2023-05-05 | Stop reason: HOSPADM

## 2023-05-03 RX ORDER — ENOXAPARIN SODIUM 100 MG/ML
40 INJECTION SUBCUTANEOUS DAILY
Status: DISCONTINUED | OUTPATIENT
Start: 2023-05-04 | End: 2023-05-05 | Stop reason: HOSPADM

## 2023-05-03 RX ORDER — MONTELUKAST SODIUM 10 MG/1
10 TABLET ORAL NIGHTLY
Status: DISCONTINUED | OUTPATIENT
Start: 2023-05-03 | End: 2023-05-05 | Stop reason: HOSPADM

## 2023-05-03 RX ORDER — FEXOFENADINE HCL 180 MG/1
180 TABLET ORAL DAILY
Status: DISCONTINUED | OUTPATIENT
Start: 2023-05-04 | End: 2023-05-03 | Stop reason: CLARIF

## 2023-05-03 RX ORDER — SODIUM CHLORIDE 0.9 % (FLUSH) 0.9 %
5-40 SYRINGE (ML) INJECTION PRN
Status: DISCONTINUED | OUTPATIENT
Start: 2023-05-03 | End: 2023-05-05 | Stop reason: HOSPADM

## 2023-05-03 RX ORDER — ONDANSETRON 4 MG/1
4 TABLET, ORALLY DISINTEGRATING ORAL EVERY 8 HOURS PRN
Status: DISCONTINUED | OUTPATIENT
Start: 2023-05-03 | End: 2023-05-05 | Stop reason: HOSPADM

## 2023-05-03 RX ORDER — FLUTICASONE PROPIONATE 50 MCG
2 SPRAY, SUSPENSION (ML) NASAL DAILY
Status: DISCONTINUED | OUTPATIENT
Start: 2023-05-04 | End: 2023-05-05 | Stop reason: HOSPADM

## 2023-05-03 RX ORDER — ALBUTEROL SULFATE 0.63 MG/3ML
1 SOLUTION RESPIRATORY (INHALATION) EVERY 6 HOURS PRN
COMMUNITY

## 2023-05-03 RX ORDER — GUAIFENESIN 600 MG/1
600 TABLET, EXTENDED RELEASE ORAL 2 TIMES DAILY
Status: DISCONTINUED | OUTPATIENT
Start: 2023-05-03 | End: 2023-05-05 | Stop reason: HOSPADM

## 2023-05-03 RX ORDER — ARFORMOTEROL TARTRATE 15 UG/2ML
15 SOLUTION RESPIRATORY (INHALATION) 2 TIMES DAILY
Status: DISCONTINUED | OUTPATIENT
Start: 2023-05-03 | End: 2023-05-05 | Stop reason: HOSPADM

## 2023-05-03 RX ORDER — ROSUVASTATIN CALCIUM 10 MG/1
10 TABLET, COATED ORAL NIGHTLY
Status: DISCONTINUED | OUTPATIENT
Start: 2023-05-03 | End: 2023-05-05 | Stop reason: HOSPADM

## 2023-05-03 RX ORDER — SODIUM CHLORIDE 0.9 % (FLUSH) 0.9 %
5-40 SYRINGE (ML) INJECTION EVERY 12 HOURS SCHEDULED
Status: DISCONTINUED | OUTPATIENT
Start: 2023-05-03 | End: 2023-05-05 | Stop reason: HOSPADM

## 2023-05-03 RX ORDER — BUDESONIDE 0.5 MG/2ML
0.5 INHALANT ORAL 2 TIMES DAILY
Status: DISCONTINUED | OUTPATIENT
Start: 2023-05-03 | End: 2023-05-05 | Stop reason: HOSPADM

## 2023-05-03 RX ORDER — ACETAMINOPHEN 650 MG/1
650 SUPPOSITORY RECTAL EVERY 6 HOURS PRN
Status: DISCONTINUED | OUTPATIENT
Start: 2023-05-03 | End: 2023-05-05 | Stop reason: HOSPADM

## 2023-05-03 RX ORDER — MAGNESIUM SULFATE IN WATER 40 MG/ML
2000 INJECTION, SOLUTION INTRAVENOUS PRN
Status: DISCONTINUED | OUTPATIENT
Start: 2023-05-03 | End: 2023-05-05 | Stop reason: HOSPADM

## 2023-05-03 RX ORDER — LORAZEPAM 1 MG/1
1 TABLET ORAL ONCE
Status: COMPLETED | OUTPATIENT
Start: 2023-05-03 | End: 2023-05-03

## 2023-05-03 RX ORDER — SODIUM CHLORIDE 9 MG/ML
INJECTION, SOLUTION INTRAVENOUS PRN
Status: DISCONTINUED | OUTPATIENT
Start: 2023-05-03 | End: 2023-05-05 | Stop reason: HOSPADM

## 2023-05-03 RX ORDER — PREDNISONE 10 MG/1
10 TABLET ORAL DAILY
Status: ON HOLD | COMMUNITY
End: 2023-05-05 | Stop reason: SDUPTHER

## 2023-05-03 RX ORDER — ACETAMINOPHEN 325 MG/1
650 TABLET ORAL EVERY 6 HOURS PRN
Status: DISCONTINUED | OUTPATIENT
Start: 2023-05-03 | End: 2023-05-05 | Stop reason: HOSPADM

## 2023-05-03 RX ORDER — 0.9 % SODIUM CHLORIDE 0.9 %
1000 INTRAVENOUS SOLUTION INTRAVENOUS ONCE
Status: COMPLETED | OUTPATIENT
Start: 2023-05-03 | End: 2023-05-03

## 2023-05-03 RX ADMIN — GUAIFENESIN 600 MG: 600 TABLET, EXTENDED RELEASE ORAL at 23:27

## 2023-05-03 RX ADMIN — MAGNESIUM SULFATE HEPTAHYDRATE 2000 MG: 40 INJECTION, SOLUTION INTRAVENOUS at 19:36

## 2023-05-03 RX ADMIN — BUDESONIDE 500 MCG: 0.5 INHALANT RESPIRATORY (INHALATION) at 23:46

## 2023-05-03 RX ADMIN — MONTELUKAST 10 MG: 10 TABLET, FILM COATED ORAL at 23:42

## 2023-05-03 RX ADMIN — NYSTATIN 500000 UNITS: 100000 SUSPENSION ORAL at 23:42

## 2023-05-03 RX ADMIN — ALBUTEROL SULFATE 2.5 MG: 2.5 SOLUTION RESPIRATORY (INHALATION) at 23:46

## 2023-05-03 RX ADMIN — WATER 40 MG: 1 INJECTION INTRAMUSCULAR; INTRAVENOUS; SUBCUTANEOUS at 23:30

## 2023-05-03 RX ADMIN — WATER 40 MG: 1 INJECTION INTRAMUSCULAR; INTRAVENOUS; SUBCUTANEOUS at 18:51

## 2023-05-03 RX ADMIN — SODIUM CHLORIDE 1000 ML: 9 INJECTION, SOLUTION INTRAVENOUS at 18:51

## 2023-05-03 RX ADMIN — IPRATROPIUM BROMIDE AND ALBUTEROL SULFATE 1 AMPULE: .5; 3 SOLUTION RESPIRATORY (INHALATION) at 19:29

## 2023-05-03 RX ADMIN — ARFORMOTEROL TARTRATE 15 MCG: 15 SOLUTION RESPIRATORY (INHALATION) at 23:46

## 2023-05-03 RX ADMIN — LORAZEPAM 1 MG: 1 TABLET ORAL at 16:49

## 2023-05-03 RX ADMIN — DOXYCYCLINE 100 MG: 100 INJECTION, POWDER, LYOPHILIZED, FOR SOLUTION INTRAVENOUS at 23:15

## 2023-05-03 RX ADMIN — ROSUVASTATIN CALCIUM 10 MG: 10 TABLET, FILM COATED ORAL at 23:42

## 2023-05-03 RX ADMIN — IOPAMIDOL 100 ML: 755 INJECTION, SOLUTION INTRAVENOUS at 21:12

## 2023-05-03 RX ADMIN — IPRATROPIUM BROMIDE AND ALBUTEROL SULFATE 1 AMPULE: .5; 3 SOLUTION RESPIRATORY (INHALATION) at 18:14

## 2023-05-03 RX ADMIN — SODIUM CHLORIDE, PRESERVATIVE FREE 10 ML: 5 INJECTION INTRAVENOUS at 23:30

## 2023-05-03 ASSESSMENT — PAIN - FUNCTIONAL ASSESSMENT: PAIN_FUNCTIONAL_ASSESSMENT: NONE - DENIES PAIN

## 2023-05-03 ASSESSMENT — PAIN SCALES - GENERAL: PAINLEVEL_OUTOF10: 0

## 2023-05-03 NOTE — ED TRIAGE NOTES
SOB worse today. Was seen here 2 days ago seen by pulmonologist and was told to come back if she got worse.

## 2023-05-03 NOTE — ED PROVIDER NOTES
EMERGENCY DEPARTMENT HISTORY AND PHYSICAL EXAM      Patient Name: Monet Rubin  MRN: [id number]  YOB: 1970  Provider: CONNIE Serra  PCP: Naresh Ansari MD   Time/Date of evaluation: 3:53 PM EDT on 5/3/23    History of Presenting Illness     Chief Complaint   Patient presents with    Shortness of Breath       History Provided By: Patient     History Jakob Rey):   Monet Rubin is a 52 y.o. female with a PMHX of asthma   who presents to the emergency department  by POV C/O shortness of breath. Patient has been having shortness of breath for the past week or so. She was seen at [facility] on 4/26 had extensive work-up including CTA that did not show any PE or dissection. She was found be slightly hypokalemic. She was started on prednisone. She was seen in this emergency department on 4/28. Hyperventilating in triage and appeared very anxious. She had respiratory panel obtained which did not show any positive results. She was given a dose of Ativan in the ED which seemed to have made it better. She was discharged with few p.o. Ativan. She followed up with pulmonology Dr. Gisela Sorensen yesterday. Patient states he instructed her not to take the Ativan and to come back to the emergency department for evaluation if symptoms continue. This morning she began feeling much more short of breath she did use her nebulizer. She has not been taking her Ativan. She denies chest pain or leg swelling. No fevers.         Past History     Past Medical History:  Past Medical History:   Diagnosis Date    Anxiety     Asthma     seasonal allegies uses inhaler    Chronic pain     lower pain    GERD (gastroesophageal reflux disease)     Liver disease     fatty liver    Nausea & vomiting     nausea    Varicose vein of leg        Past Surgical History:  Past Surgical History:   Procedure Laterality Date    BREAST BIOPSY      and marker placement    COLONOSCOPY N/A 03/04/2022    COLONOSCOPY WITH MAC

## 2023-05-04 ENCOUNTER — APPOINTMENT (OUTPATIENT)
Facility: HOSPITAL | Age: 53
DRG: 202 | End: 2023-05-04
Payer: OTHER GOVERNMENT

## 2023-05-04 PROBLEM — J45.51 SEVERE PERSISTENT ASTHMA WITH ACUTE EXACERBATION: Status: ACTIVE | Noted: 2023-05-04

## 2023-05-04 LAB
ALBUMIN SERPL-MCNC: 3.2 G/DL (ref 3.4–5)
ALBUMIN/GLOB SERPL: 1.1 (ref 0.8–1.7)
ALP SERPL-CCNC: 44 U/L (ref 45–117)
ALT SERPL-CCNC: 24 U/L (ref 13–56)
ANION GAP SERPL CALC-SCNC: 8 MMOL/L (ref 3–18)
AST SERPL-CCNC: 10 U/L (ref 10–38)
BILIRUB SERPL-MCNC: 0.6 MG/DL (ref 0.2–1)
BUN SERPL-MCNC: 11 MG/DL (ref 7–18)
BUN/CREAT SERPL: 11 (ref 12–20)
CALCIUM SERPL-MCNC: 8.1 MG/DL (ref 8.5–10.1)
CHLORIDE SERPL-SCNC: 113 MMOL/L (ref 100–111)
CO2 SERPL-SCNC: 21 MMOL/L (ref 21–32)
CREAT SERPL-MCNC: 1.01 MG/DL (ref 0.6–1.3)
ECHO AO ASC DIAM: 3.1 CM
ECHO AO ASCENDING AORTA INDEX: 1.66 CM/M2
ECHO AO ROOT DIAM: 3.2 CM
ECHO AO ROOT INDEX: 1.71 CM/M2
ECHO AV AREA PEAK VELOCITY: 3.3 CM2
ECHO AV AREA VTI: 3 CM2
ECHO AV AREA/BSA PEAK VELOCITY: 1.8 CM2/M2
ECHO AV AREA/BSA VTI: 1.6 CM2/M2
ECHO AV MEAN GRADIENT: 5 MMHG
ECHO AV MEAN VELOCITY: 1.1 M/S
ECHO AV PEAK GRADIENT: 8 MMHG
ECHO AV PEAK VELOCITY: 1.4 M/S
ECHO AV VELOCITY RATIO: 1
ECHO AV VTI: 28.6 CM
ECHO BSA: 1.91 M2
ECHO BSA: 1.91 M2
ECHO EST RA PRESSURE: 3 MMHG
ECHO IVC EXP: 1.9 CM
ECHO LA DIAMETER INDEX: 2.25 CM/M2
ECHO LA DIAMETER: 4.2 CM
ECHO LA TO AORTIC ROOT RATIO: 1.31
ECHO LA VOL 2C: 44 ML (ref 22–52)
ECHO LA VOL 4C: 57 ML (ref 22–52)
ECHO LA VOL BP: 49 ML (ref 22–52)
ECHO LA VOL/BSA BIPLANE: 26 ML/M2 (ref 16–34)
ECHO LA VOLUME AREA LENGTH: 51 ML
ECHO LA VOLUME INDEX A2C: 24 ML/M2 (ref 16–34)
ECHO LA VOLUME INDEX A4C: 30 ML/M2 (ref 16–34)
ECHO LA VOLUME INDEX AREA LENGTH: 27 ML/M2 (ref 16–34)
ECHO LV E' LATERAL VELOCITY: 13 CM/S
ECHO LV E' SEPTAL VELOCITY: 10 CM/S
ECHO LV EDV A2C: 102 ML
ECHO LV EDV A4C: 98 ML
ECHO LV EDV BP: 101 ML (ref 56–104)
ECHO LV EDV INDEX A4C: 52 ML/M2
ECHO LV EDV INDEX BP: 54 ML/M2
ECHO LV EDV NDEX A2C: 55 ML/M2
ECHO LV EJECTION FRACTION A2C: 66 %
ECHO LV EJECTION FRACTION A4C: 69 %
ECHO LV EJECTION FRACTION BIPLANE: 65 % (ref 55–100)
ECHO LV ESV A2C: 35 ML
ECHO LV ESV A4C: 31 ML
ECHO LV ESV BP: 36 ML (ref 19–49)
ECHO LV ESV INDEX A2C: 19 ML/M2
ECHO LV ESV INDEX A4C: 17 ML/M2
ECHO LV ESV INDEX BP: 19 ML/M2
ECHO LV FRACTIONAL SHORTENING: 37 % (ref 28–44)
ECHO LV GLOBAL LONGITUDINAL STRAIN (GLS): -24.6 %
ECHO LV GLOBAL LONGITUDINAL STRAIN (GLS): -24.7 %
ECHO LV GLOBAL LONGITUDINAL STRAIN (GLS): -24.9 %
ECHO LV GLOBAL LONGITUDINAL STRAIN (GLS): -25.3 %
ECHO LV INTERNAL DIMENSION DIASTOLE INDEX: 2.73 CM/M2
ECHO LV INTERNAL DIMENSION DIASTOLIC: 5.1 CM (ref 3.9–5.3)
ECHO LV INTERNAL DIMENSION SYSTOLIC INDEX: 1.71 CM/M2
ECHO LV INTERNAL DIMENSION SYSTOLIC: 3.2 CM
ECHO LV IVSD: 0.8 CM (ref 0.6–0.9)
ECHO LV MASS 2D: 140.5 G (ref 67–162)
ECHO LV MASS INDEX 2D: 75.1 G/M2 (ref 43–95)
ECHO LV POSTERIOR WALL DIASTOLIC: 0.8 CM (ref 0.6–0.9)
ECHO LV RELATIVE WALL THICKNESS RATIO: 0.31
ECHO LVOT AREA: 3.1 CM2
ECHO LVOT AV VTI INDEX: 0.93
ECHO LVOT DIAM: 2 CM
ECHO LVOT MEAN GRADIENT: 4 MMHG
ECHO LVOT PEAK GRADIENT: 8 MMHG
ECHO LVOT PEAK VELOCITY: 1.4 M/S
ECHO LVOT STROKE VOLUME INDEX: 44.5 ML/M2
ECHO LVOT SV: 83.2 ML
ECHO LVOT VTI: 26.5 CM
ECHO MV A VELOCITY: 0.93 M/S
ECHO MV E DECELERATION TIME (DT): 204.1 MS
ECHO MV E VELOCITY: 0.82 M/S
ECHO MV E/A RATIO: 0.88
ECHO MV E/E' LATERAL: 6.31
ECHO MV E/E' RATIO (AVERAGED): 7.25
ECHO MV E/E' SEPTAL: 8.2
ECHO PV MAX VELOCITY: 1.1 M/S
ECHO PV PEAK GRADIENT: 5 MMHG
ECHO PVEIN A DURATION: 65.7 MS
ECHO PVEIN A VELOCITY: 0.4 M/S
ECHO PVEIN PEAK D VELOCITY: 0.3 M/S
ECHO PVEIN PEAK S VELOCITY: 0.7 M/S
ECHO PVEIN S/D RATIO: 2.3
ECHO RA END SYSTOLIC VOLUME APICAL 4 CHAMBER INDEX BSA: 11 ML/M2
ECHO RA VOLUME BIPLANE METHOD OF DISKS: 21 ML
ECHO RA VOLUME INDEX BP: 11 ML/M2
ECHO RA VOLUME: 21 ML
ECHO RIGHT VENTRICULAR SYSTOLIC PRESSURE (RVSP): 24 MMHG
ECHO RV FREE WALL PEAK S': 17 CM/S
ECHO RV INTERNAL DIMENSION: 2.7 CM
ECHO RV TAPSE: 2.7 CM (ref 1.7–?)
ECHO RVOT PEAK GRADIENT: 3 MMHG
ECHO RVOT PEAK VELOCITY: 0.9 M/S
ECHO TV REGURGITANT MAX VELOCITY: 2.3 M/S
ECHO TV REGURGITANT PEAK GRADIENT: 21 MMHG
ERYTHROCYTE [DISTWIDTH] IN BLOOD BY AUTOMATED COUNT: 13.1 % (ref 11.6–14.5)
GLOBULIN SER CALC-MCNC: 2.9 G/DL (ref 2–4)
GLUCOSE SERPL-MCNC: 284 MG/DL (ref 74–99)
HCT VFR BLD AUTO: 36.8 % (ref 35–45)
HGB BLD-MCNC: 12.1 G/DL (ref 12–16)
LACTATE SERPL-SCNC: 3.6 MMOL/L (ref 0.4–2)
LACTATE SERPL-SCNC: 3.8 MMOL/L (ref 0.4–2)
MAGNESIUM SERPL-MCNC: 2.4 MG/DL (ref 1.6–2.6)
MCH RBC QN AUTO: 30.7 PG (ref 24–34)
MCHC RBC AUTO-ENTMCNC: 32.9 G/DL (ref 31–37)
MCV RBC AUTO: 93.4 FL (ref 78–100)
NRBC # BLD: 0 K/UL (ref 0–0.01)
NRBC BLD-RTO: 0 PER 100 WBC
PHOSPHATE SERPL-MCNC: 3.6 MG/DL (ref 2.5–4.9)
PLATELET # BLD AUTO: 214 K/UL (ref 135–420)
PMV BLD AUTO: 9.5 FL (ref 9.2–11.8)
POTASSIUM SERPL-SCNC: 4 MMOL/L (ref 3.5–5.5)
PROT SERPL-MCNC: 6.1 G/DL (ref 6.4–8.2)
RBC # BLD AUTO: 3.94 M/UL (ref 4.2–5.3)
SODIUM SERPL-SCNC: 142 MMOL/L (ref 136–145)
TROPONIN I SERPL HS-MCNC: 5 NG/L (ref 0–54)
TROPONIN I SERPL HS-MCNC: 5 NG/L (ref 0–54)
TROPONIN I SERPL HS-MCNC: 6 NG/L (ref 0–54)
WBC # BLD AUTO: 10.9 K/UL (ref 4.6–13.2)

## 2023-05-04 PROCEDURE — 6360000002 HC RX W HCPCS: Performed by: INTERNAL MEDICINE

## 2023-05-04 PROCEDURE — 2700000000 HC OXYGEN THERAPY PER DAY

## 2023-05-04 PROCEDURE — 93970 EXTREMITY STUDY: CPT

## 2023-05-04 PROCEDURE — 6370000000 HC RX 637 (ALT 250 FOR IP): Performed by: INTERNAL MEDICINE

## 2023-05-04 PROCEDURE — 84484 ASSAY OF TROPONIN QUANT: CPT

## 2023-05-04 PROCEDURE — C9113 INJ PANTOPRAZOLE SODIUM, VIA: HCPCS | Performed by: INTERNAL MEDICINE

## 2023-05-04 PROCEDURE — 2580000003 HC RX 258: Performed by: INTERNAL MEDICINE

## 2023-05-04 PROCEDURE — 93306 TTE W/DOPPLER COMPLETE: CPT

## 2023-05-04 PROCEDURE — 83605 ASSAY OF LACTIC ACID: CPT

## 2023-05-04 PROCEDURE — 80053 COMPREHEN METABOLIC PANEL: CPT

## 2023-05-04 PROCEDURE — 94640 AIRWAY INHALATION TREATMENT: CPT

## 2023-05-04 PROCEDURE — 6370000000 HC RX 637 (ALT 250 FOR IP): Performed by: PHYSICIAN ASSISTANT

## 2023-05-04 PROCEDURE — 85027 COMPLETE CBC AUTOMATED: CPT

## 2023-05-04 PROCEDURE — 36415 COLL VENOUS BLD VENIPUNCTURE: CPT

## 2023-05-04 PROCEDURE — 83735 ASSAY OF MAGNESIUM: CPT

## 2023-05-04 PROCEDURE — 84100 ASSAY OF PHOSPHORUS: CPT

## 2023-05-04 PROCEDURE — 2500000003 HC RX 250 WO HCPCS: Performed by: INTERNAL MEDICINE

## 2023-05-04 PROCEDURE — 1100000000 HC RM PRIVATE

## 2023-05-04 PROCEDURE — A4216 STERILE WATER/SALINE, 10 ML: HCPCS | Performed by: INTERNAL MEDICINE

## 2023-05-04 RX ORDER — IPRATROPIUM BROMIDE AND ALBUTEROL SULFATE 2.5; .5 MG/3ML; MG/3ML
1 SOLUTION RESPIRATORY (INHALATION) EVERY 4 HOURS PRN
Status: DISCONTINUED | OUTPATIENT
Start: 2023-05-04 | End: 2023-05-05 | Stop reason: HOSPADM

## 2023-05-04 RX ORDER — ALPRAZOLAM 0.5 MG/1
0.5 TABLET ORAL EVERY 6 HOURS PRN
Status: DISCONTINUED | OUTPATIENT
Start: 2023-05-04 | End: 2023-05-05 | Stop reason: HOSPADM

## 2023-05-04 RX ORDER — SODIUM CHLORIDE 9 MG/ML
INJECTION, SOLUTION INTRAVENOUS CONTINUOUS
Status: DISCONTINUED | OUTPATIENT
Start: 2023-05-04 | End: 2023-05-05

## 2023-05-04 RX ORDER — HYDROCODONE POLISTIREX AND CHLORPHENIRAMINE POLISTIREX 10; 8 MG/5ML; MG/5ML
5 SUSPENSION, EXTENDED RELEASE ORAL EVERY 12 HOURS PRN
Status: DISCONTINUED | OUTPATIENT
Start: 2023-05-04 | End: 2023-05-05 | Stop reason: HOSPADM

## 2023-05-04 RX ORDER — PAROXETINE 10 MG/1
10 TABLET, FILM COATED ORAL DAILY
Status: DISCONTINUED | OUTPATIENT
Start: 2023-05-04 | End: 2023-05-05 | Stop reason: HOSPADM

## 2023-05-04 RX ORDER — SODIUM CHLORIDE 9 MG/ML
INJECTION, SOLUTION INTRAVENOUS ONCE
Status: COMPLETED | OUTPATIENT
Start: 2023-05-04 | End: 2023-05-04

## 2023-05-04 RX ADMIN — MONTELUKAST 10 MG: 10 TABLET, FILM COATED ORAL at 20:54

## 2023-05-04 RX ADMIN — NYSTATIN 500000 UNITS: 100000 SUSPENSION ORAL at 20:54

## 2023-05-04 RX ADMIN — WATER 40 MG: 1 INJECTION INTRAMUSCULAR; INTRAVENOUS; SUBCUTANEOUS at 05:00

## 2023-05-04 RX ADMIN — NYSTATIN 500000 UNITS: 100000 SUSPENSION ORAL at 12:58

## 2023-05-04 RX ADMIN — NYSTATIN 500000 UNITS: 100000 SUSPENSION ORAL at 16:24

## 2023-05-04 RX ADMIN — SODIUM CHLORIDE: 9 INJECTION, SOLUTION INTRAVENOUS at 01:12

## 2023-05-04 RX ADMIN — ARFORMOTEROL TARTRATE 15 MCG: 15 SOLUTION RESPIRATORY (INHALATION) at 07:17

## 2023-05-04 RX ADMIN — BUDESONIDE 500 MCG: 0.5 INHALANT RESPIRATORY (INHALATION) at 19:44

## 2023-05-04 RX ADMIN — NYSTATIN 500000 UNITS: 100000 SUSPENSION ORAL at 10:00

## 2023-05-04 RX ADMIN — DOXYCYCLINE 100 MG: 100 INJECTION, POWDER, LYOPHILIZED, FOR SOLUTION INTRAVENOUS at 23:02

## 2023-05-04 RX ADMIN — IPRATROPIUM BROMIDE 0.5 MG: 0.5 SOLUTION RESPIRATORY (INHALATION) at 19:44

## 2023-05-04 RX ADMIN — CETIRIZINE HYDROCHLORIDE 10 MG: 10 TABLET, FILM COATED ORAL at 10:00

## 2023-05-04 RX ADMIN — FLUTICASONE PROPIONATE 2 SPRAY: 50 SPRAY, METERED NASAL at 10:49

## 2023-05-04 RX ADMIN — SODIUM CHLORIDE: 9 INJECTION, SOLUTION INTRAVENOUS at 01:13

## 2023-05-04 RX ADMIN — SODIUM CHLORIDE, PRESERVATIVE FREE 10 ML: 5 INJECTION INTRAVENOUS at 21:18

## 2023-05-04 RX ADMIN — SODIUM CHLORIDE, PRESERVATIVE FREE 10 ML: 5 INJECTION INTRAVENOUS at 09:00

## 2023-05-04 RX ADMIN — ROSUVASTATIN CALCIUM 10 MG: 10 TABLET, FILM COATED ORAL at 20:53

## 2023-05-04 RX ADMIN — DOXYCYCLINE 100 MG: 100 INJECTION, POWDER, LYOPHILIZED, FOR SOLUTION INTRAVENOUS at 10:32

## 2023-05-04 RX ADMIN — PANTOPRAZOLE SODIUM 40 MG: 40 INJECTION, POWDER, FOR SOLUTION INTRAVENOUS at 10:00

## 2023-05-04 RX ADMIN — CYANOCOBALAMIN TAB 1000 MCG 1000 MCG: 1000 TAB at 10:00

## 2023-05-04 RX ADMIN — CEFTRIAXONE 1000 MG: 1 INJECTION, POWDER, FOR SOLUTION INTRAMUSCULAR; INTRAVENOUS at 01:14

## 2023-05-04 RX ADMIN — CHOLECALCIFEROL TAB 125 MCG (5000 UNIT) 5000 UNITS: 125 TAB at 10:00

## 2023-05-04 RX ADMIN — PAROXETINE HYDROCHLORIDE 10 MG: 20 TABLET, FILM COATED ORAL at 10:32

## 2023-05-04 RX ADMIN — IPRATROPIUM BROMIDE AND ALBUTEROL SULFATE 1 AMPULE: .5; 3 SOLUTION RESPIRATORY (INHALATION) at 07:16

## 2023-05-04 RX ADMIN — IPRATROPIUM BROMIDE 0.5 MG: 0.5 SOLUTION RESPIRATORY (INHALATION) at 11:19

## 2023-05-04 RX ADMIN — ARFORMOTEROL TARTRATE 15 MCG: 15 SOLUTION RESPIRATORY (INHALATION) at 19:44

## 2023-05-04 RX ADMIN — GUAIFENESIN 600 MG: 600 TABLET, EXTENDED RELEASE ORAL at 10:00

## 2023-05-04 RX ADMIN — ENOXAPARIN SODIUM 40 MG: 100 INJECTION SUBCUTANEOUS at 10:00

## 2023-05-04 RX ADMIN — BUDESONIDE 500 MCG: 0.5 INHALANT RESPIRATORY (INHALATION) at 07:17

## 2023-05-04 RX ADMIN — GUAIFENESIN 600 MG: 600 TABLET, EXTENDED RELEASE ORAL at 20:54

## 2023-05-04 RX ADMIN — WATER 40 MG: 1 INJECTION INTRAMUSCULAR; INTRAVENOUS; SUBCUTANEOUS at 16:24

## 2023-05-04 ASSESSMENT — PAIN SCALES - GENERAL
PAINLEVEL_OUTOF10: 0

## 2023-05-04 NOTE — CARE COORDINATION
D/c plan: Home. Family to transport. Pt transferred to the floor from ICU. CM spoke w/ pt. Confirmed information on the pt's face sheet. Pt lives at home w/ her spouse. Pt is independent in all ADLs and IDLs at baseline without an assistive device. Anticipate pt will d/c back home and spouse will transport. CM will follow to assist w/ care transition needs. Pt has a nebulizer. Dr. Jasmeet Kendrick is the pt's pulmonologist.     Case Management Assessment  Initial Evaluation    Date/Time of Evaluation: 5/4/2023 2:04 PM  Assessment Completed by: Reji Loredo RN    If patient is discharged prior to next notation, then this note serves as note for discharge by case management. Patient Name: Renae Allen                   YOB: 1970  Diagnosis: Dyspnea [R06.00]                   Date / Time: 5/3/2023  3:50 PM    Patient Admission Status: Inpatient   Readmission Risk (Low < 19, Mod (19-27), High > 27): Readmission Risk Score: 9.6    Current PCP: Lupe Jackson MD  PCP verified by CM? Yes    Chart Reviewed: Yes      History Provided by: Patient  Patient Orientation: Alert and Oriented    Patient Cognition: Alert    Hospitalization in the last 30 days (Readmission):  No    If yes, Readmission Assessment in CM Navigator will be completed. Advance Directives:      Code Status: Full Code   Patient's Primary Decision Maker is:      Primary Decision Maker: Joao Guzman - Spouse - [phone]    Discharge Planning:    Patient lives with: Spouse/Significant Other Type of Home: House  Primary Care Giver: Self  Patient Support Systems include: Spouse/Significant Other   Current Financial resources: Other (Comment) (Romana Levy)  Current community resources: None  Current services prior to admission: None            Current DME:              Type of Home Care services:  None    ADLS  Prior functional level: Independent in ADLs/IADLs  Current functional level:  Independent in

## 2023-05-04 NOTE — ACP (ADVANCE CARE PLANNING)
Advance Care Planning     Advance Care Planning Inpatient Note  [facility] Department    Today's Date: 5/4/2023  Unit: [facility] INTENSIVE CARE    Received request from rounding. Upon review of chart and communication with care team, patient's decision making abilities are not in question. . Patient and Spouse were present in the room during visit. Goals of ACP Conversation:  Discuss advance care planning documents    Health Care Decision Makers:       Primary Decision Maker: Kieran Obrien  - [phone]    Summary:  Documented Next of Kin, per patient report    Advance Care Planning Documents (Patient Wishes):  None     Assessment:  Patient is a nurse.  at the bedside. She got a bit teary when talking about how scary it was to not be able to breathe. I reviewed the documents and the patient was very interested in reviewing them. She asked for an extra copy for her . I left the folder and told them to call me and I would get it witnessed and copied when she was ready.     Interventions:  Provided education on documents for clarity and greater understanding  Discussed and provided education on state decision maker hierarchy  Encouraged ongoing ACP conversation with future decision makers and loved ones      Electronically signed by Roslyn Stoll, [employer] on 5/4/2023 at 11:57 AM

## 2023-05-04 NOTE — ACP (ADVANCE CARE PLANNING)
AMD Completed   provided education on Advance Medical Directives and assisted patient in completing the form. Patient named her  Verner Cue as her agent. A copy was placed in the chart for scanning. Original and extra copies were returned to the patient.  completed visit with patient and offered Pastoral care. Chaplains will continue to follow and will provide pastoral care as needed or requested.       Sister Verito Lester, [place], [address]  [phone]

## 2023-05-04 NOTE — PROGRESS NOTES
Hospitalist Progress Note    Patient: Danny Rey MRN: [id number]  CSN: [id number]    YOB: 1970  Age: 52 y.o. Sex: female    DOA: 5/3/2023 LOS:  LOS: 1 day          Chief Complaint:    SOB      Assessment/Plan     Severe asthma exacerbation, persistent  Bronchitis  Lactic acidosis   Respiratory alkalosis  URENA  Chronic pain disorder    Steroids IV  Abx-rocephin, doxy  Viral panel is negative  CTA chest no PE  Ct abd negative    Nebs routinely  Mucinex  Tussionex PRN bad cough  02 support as needed    Pulmonology on case      Repeat COVID is negative a full respiratory viral panel was done was also negative     DVT/GI Prophylaxis: Lovenox     Disposition :  Active Hospital Problems    Diagnosis     Severe persistent asthma with acute exacerbation [J45.51]     Dyspnea [R06.00]     Active asthma [J45.909]     Acute respiratory alkalosis [E87.3]     Tachypnea [R06.82]     Venous insufficiency of both lower extremities [I87.2]     Chronic pain disorder [G89.4]        Subjective:  Still with cough and SOB with minimal activity  No prod cough  No F/C  No chest pain      Review of systems:    Constitutional: denies fevers, chills  Respiratory: SOB, cough  Cardiovascular: denies chest pain, palpitations  Gastrointestinal: denies nausea, vomiting, diarrhea      Vital signs/Intake and Output:  Visit Vitals  /70   Pulse 82   Temp 98.1 °F (36.7 °C) (Oral)   Resp 22   Ht 5' 5\" (1.651 m)   Wt 176 lb (79.8 kg)   SpO2 97%   BMI 29.29 kg/m²     Current Shift:  No intake/output data recorded.   Last three shifts:  05/02 1901 - 05/04 0700  In: -   Out: 2200 [Urine:2200]    Exam:    General: Well developed, alert, NAD, OX3  CVS:Regular rate and rhythm, no M/R/G, S1/S2 heard, no thrill  Lungs:Clear to auscultation bilaterally, no wheezes, rhonchi, or rales  Abdomen: Soft, Nontender, No distention, Normal Bowel sounds  Extremities: No C/C/E, pulses palpable 2+  Neuro:grossly normal , follows

## 2023-05-04 NOTE — ACP (ADVANCE CARE PLANNING)
Advance Care Planning     Advance Care Planning Inpatient Note  [facility]    Today's Date: 5/4/2023  Unit: THE [address] SURGICAL/ONCOLOGY    Received request from patient. Upon review of chart and communication with care team, patient's decision making abilities are not in question. . Patient and Spouse was/were present in the room during visit. Goals of ACP Conversation:  Discuss advance care planning documents  Facilitate a discussion related to patient's goals of care as they align with the patient's values and beliefs. Health Care Decision Makers:       Primary Decision Maker: Leticia Smiley Spouse - [phone]  Summary:  Completed New Documents  Documented Next of Kin, per patient report    Advance Care Planning Documents (Patient Wishes):  Healthcare Power of /Advance Directive Appointment of [address]  Living Will/Advance Directive  Anatomical Gift/Organ Donation     Assessment:  Patient was with her  and she chose him to be her Agent. Interventions:  Provided education on documents for clarity and greater understanding  Discussed and provided education on state decision maker hierarchy  Assisted in the completion of documents according to patient's wishes at this time  Encouraged ongoing ACP conversation with future decision makers and loved ones    Care Preferences Communicated:   No    Outcomes/Plan:  New advance directive completed. Existing advance directive reviewed with patient; no changes to patient's previously recorded wishes. Returned original document(s) to patient, as well as copies for distribution to appointed agents  Copy of advance directive given to staff to scan into medical record.     Electronically signed by John Hale on 5/4/2023 at 4:38 PM

## 2023-05-04 NOTE — H&P
History & Physical    Patient: Lesia Worthy MRN: [id number]  CSN: [id number]    YOB: 1970  Age: 52 y.o. Sex: female      DOA: 5/3/2023    Chief Complaint:   Chief Complaint   Patient presents with    Shortness of [address] Problems    Diagnosis Date Noted    Dyspnea [R06.00] 05/03/2023    Active asthma [J45.909] 05/03/2023    Acute respiratory alkalosis [E87.3] 05/03/2023    Tachypnea [R06.82] 05/03/2023    Venous insufficiency of both lower extremities [I87.2] 04/30/2018    Chronic pain disorder [G89.4] 02/24/2014          HPI:     Lesia Worthy is a 52 y.o.  female who has history of anxiety, seasonal allergies, asthma varicose veins presents to the emergency room for the third time with complaints of cough congestion and difficulty breathing. Patient has been having difficulty walking her dogs over the last 2 weeks. Her  came home from a trip from [place] and was severely ill and 5 days later the patient developed respiratory symptoms. This included fevers chills cough and congestion. She went to emergency room at [address] [place] [address] Way was done there was no pneumonia or PE and she was discharged on antibiotics and steroids. She follow-up with her pulmonologist who noted worsening dyspnea and she was told to come to the emergency room. In the ER she was found to have tachypnea with severe respiratory alkalosis repeat CTA was ordered and shows bronchial inflammation please see full report.   Due to worsening alkalosis she is admitted to the ICU for 24 hours    Past Medical History:   Diagnosis Date    Anxiety     Asthma     seasonal allegies uses inhaler    Chronic pain     lower pain    GERD (gastroesophageal reflux disease)     Liver disease     fatty liver    Nausea & vomiting     nausea    Varicose vein of leg        Past Surgical History:   Procedure Laterality Date    BREAST BIOPSY      and marker placement    COLONOSCOPY N/A 03/04/2022    COLONOSCOPY WITH MAC

## 2023-05-04 NOTE — CONSULTS
[facility]  Pulmonary, Critical Care, and Sleep Medicine    Name: Monet Rubin MRN: [id number]   : 1970 Hospital: [facility] [place]    Date: 2023  Room: [address] [place] [address] Note                                              Consult requesting physician: Dr. Reyna Anderson  Reason for Consult: Asthma exacerbation. IMPRESSION:     Severe persistent asthma with acute exacerbation. Acute respiratory alkalosis. Dyspnea. Patient Active Problem List   Diagnosis Code    Dyspnea R06.00    Active asthma J45.909    Acute respiratory alkalosis E87.3    Tachypnea R06.82    Chronic pain disorder G89.4    Gastroesophageal reflux disease K21.9    Venous insufficiency of both lower extremities I87.2    Severe persistent asthma with acute exacerbation J45.51       Code status: Full Code       RECOMMENDATIONS:     Respiratory: History of allergic rhinitis, asthma admitted with persistent and recurrence of dyspnea, respiratory alkalosis. Recent 3 multiple ER visits and pulmonary office visit. Previously her pulmonary symptoms were well controlled with Singulair 10 mg nightly and albuterol as needed only. Her symptoms started since her  went out of country; who has returned back. I believe anxiety is contributing to her asthma-like symptoms as well. CTA chest 5/3/2023: No PE. Diffuse small airway inflammation. No pneumonia. ABG: Respiratory alkalosis. Bronchodilators: Continue Brovana nebs twice daily, Pulmicort nebs twice daily. Change DuoNeb every 4 hours to as needed. Start ipratropium nebs 4 times daily. Continue albuterol as needed. Steroids: Reduce Solu-Medrol from 40 mg IV every 8 hours to 40 mg IV every 12 hours. Titrate further per clinical course. Antibiotics: No pneumonia on CT chest.  DC Rocephin. Complete doxycycline 5-day course for possible bronchitis. Allergic rhinitis therapy: Continue Zyrtec and Flonase.   Continue Singulair 10 mg

## 2023-05-05 VITALS
TEMPERATURE: 98.2 F | HEART RATE: 70 BPM | OXYGEN SATURATION: 98 % | SYSTOLIC BLOOD PRESSURE: 127 MMHG | BODY MASS INDEX: 29.99 KG/M2 | RESPIRATION RATE: 18 BRPM | DIASTOLIC BLOOD PRESSURE: 70 MMHG | WEIGHT: 180 LBS | HEIGHT: 65 IN

## 2023-05-05 LAB
ALBUMIN SERPL-MCNC: 3.2 G/DL (ref 3.4–5)
ALBUMIN/GLOB SERPL: 1.1 (ref 0.8–1.7)
ALP SERPL-CCNC: 48 U/L (ref 45–117)
ALT SERPL-CCNC: 25 U/L (ref 13–56)
ANION GAP SERPL CALC-SCNC: 1 MMOL/L (ref 3–18)
AST SERPL-CCNC: 8 U/L (ref 10–38)
BILIRUB SERPL-MCNC: 0.5 MG/DL (ref 0.2–1)
BUN SERPL-MCNC: 15 MG/DL (ref 7–18)
BUN/CREAT SERPL: 21 (ref 12–20)
CALCIUM SERPL-MCNC: 8.9 MG/DL (ref 8.5–10.1)
CHLORIDE SERPL-SCNC: 113 MMOL/L (ref 100–111)
CO2 SERPL-SCNC: 24 MMOL/L (ref 21–32)
CREAT SERPL-MCNC: 0.73 MG/DL (ref 0.6–1.3)
EKG ATRIAL RATE: 73 BPM
EKG DIAGNOSIS: NORMAL
EKG P AXIS: 40 DEGREES
EKG P-R INTERVAL: 146 MS
EKG Q-T INTERVAL: 408 MS
EKG QRS DURATION: 92 MS
EKG QTC CALCULATION (BAZETT): 449 MS
EKG R AXIS: -3 DEGREES
EKG T AXIS: -37 DEGREES
EKG VENTRICULAR RATE: 73 BPM
ERYTHROCYTE [DISTWIDTH] IN BLOOD BY AUTOMATED COUNT: 13.2 % (ref 11.6–14.5)
GLOBULIN SER CALC-MCNC: 3 G/DL (ref 2–4)
GLUCOSE SERPL-MCNC: 165 MG/DL (ref 74–99)
HCT VFR BLD AUTO: 36.7 % (ref 35–45)
HGB BLD-MCNC: 11.7 G/DL (ref 12–16)
MAGNESIUM SERPL-MCNC: 2.2 MG/DL (ref 1.6–2.6)
MCH RBC QN AUTO: 30.1 PG (ref 24–34)
MCHC RBC AUTO-ENTMCNC: 31.9 G/DL (ref 31–37)
MCV RBC AUTO: 94.3 FL (ref 78–100)
NRBC # BLD: 0 K/UL (ref 0–0.01)
NRBC BLD-RTO: 0 PER 100 WBC
PLATELET # BLD AUTO: 227 K/UL (ref 135–420)
PMV BLD AUTO: 9.9 FL (ref 9.2–11.8)
POTASSIUM SERPL-SCNC: 4.4 MMOL/L (ref 3.5–5.5)
PROT SERPL-MCNC: 6.2 G/DL (ref 6.4–8.2)
RBC # BLD AUTO: 3.89 M/UL (ref 4.2–5.3)
SODIUM SERPL-SCNC: 138 MMOL/L (ref 136–145)
WBC # BLD AUTO: 19.5 K/UL (ref 4.6–13.2)

## 2023-05-05 PROCEDURE — C9113 INJ PANTOPRAZOLE SODIUM, VIA: HCPCS | Performed by: INTERNAL MEDICINE

## 2023-05-05 PROCEDURE — 36415 COLL VENOUS BLD VENIPUNCTURE: CPT

## 2023-05-05 PROCEDURE — 80053 COMPREHEN METABOLIC PANEL: CPT

## 2023-05-05 PROCEDURE — 2580000003 HC RX 258: Performed by: INTERNAL MEDICINE

## 2023-05-05 PROCEDURE — 6370000000 HC RX 637 (ALT 250 FOR IP): Performed by: INTERNAL MEDICINE

## 2023-05-05 PROCEDURE — A4216 STERILE WATER/SALINE, 10 ML: HCPCS | Performed by: INTERNAL MEDICINE

## 2023-05-05 PROCEDURE — 6360000002 HC RX W HCPCS: Performed by: INTERNAL MEDICINE

## 2023-05-05 PROCEDURE — 94640 AIRWAY INHALATION TREATMENT: CPT

## 2023-05-05 PROCEDURE — 83735 ASSAY OF MAGNESIUM: CPT

## 2023-05-05 PROCEDURE — 85027 COMPLETE CBC AUTOMATED: CPT

## 2023-05-05 RX ORDER — PREDNISONE 10 MG/1
TABLET ORAL
Qty: 40 TABLET | Refills: 0 | Status: SHIPPED | OUTPATIENT
Start: 2023-05-05

## 2023-05-05 RX ORDER — PAROXETINE 10 MG/1
10 TABLET, FILM COATED ORAL DAILY
Qty: 30 TABLET | Refills: 0 | Status: SHIPPED | OUTPATIENT
Start: 2023-05-06

## 2023-05-05 RX ORDER — DOXYCYCLINE HYCLATE 100 MG
100 TABLET ORAL 2 TIMES DAILY
Qty: 10 TABLET | Refills: 0 | Status: SHIPPED | OUTPATIENT
Start: 2023-05-05 | End: 2023-05-10

## 2023-05-05 RX ORDER — ALPRAZOLAM 0.5 MG/1
0.5 TABLET ORAL 3 TIMES DAILY PRN
Qty: 10 TABLET | Refills: 0 | Status: SHIPPED | OUTPATIENT
Start: 2023-05-05 | End: 2023-06-04

## 2023-05-05 RX ORDER — ALBUTEROL SULFATE 90 UG/1
2 AEROSOL, METERED RESPIRATORY (INHALATION) EVERY 4 HOURS PRN
Qty: 18 G | Refills: 3 | Status: SHIPPED | OUTPATIENT
Start: 2023-05-05

## 2023-05-05 RX ORDER — DOXYCYCLINE HYCLATE 100 MG/1
100 CAPSULE ORAL EVERY 12 HOURS SCHEDULED
Status: DISCONTINUED | OUTPATIENT
Start: 2023-05-05 | End: 2023-05-05 | Stop reason: RX

## 2023-05-05 RX ORDER — PANTOPRAZOLE SODIUM 40 MG/1
40 TABLET, DELAYED RELEASE ORAL
Status: DISCONTINUED | OUTPATIENT
Start: 2023-05-06 | End: 2023-05-05 | Stop reason: HOSPADM

## 2023-05-05 RX ORDER — GUAIFENESIN 600 MG/1
600 TABLET, EXTENDED RELEASE ORAL 2 TIMES DAILY
Qty: 14 TABLET | Refills: 0 | Status: SHIPPED | OUTPATIENT
Start: 2023-05-05

## 2023-05-05 RX ORDER — PANTOPRAZOLE SODIUM 40 MG/1
40 TABLET, DELAYED RELEASE ORAL
Qty: 30 TABLET | Refills: 5 | Status: SHIPPED | OUTPATIENT
Start: 2023-05-05

## 2023-05-05 RX ORDER — DOXYCYCLINE 100 MG/1
100 CAPSULE ORAL EVERY 12 HOURS SCHEDULED
Status: DISCONTINUED | OUTPATIENT
Start: 2023-05-05 | End: 2023-05-05 | Stop reason: HOSPADM

## 2023-05-05 RX ADMIN — ARFORMOTEROL TARTRATE 15 MCG: 15 SOLUTION RESPIRATORY (INHALATION) at 07:11

## 2023-05-05 RX ADMIN — GUAIFENESIN 600 MG: 600 TABLET, EXTENDED RELEASE ORAL at 09:02

## 2023-05-05 RX ADMIN — IPRATROPIUM BROMIDE 0.5 MG: 0.5 SOLUTION RESPIRATORY (INHALATION) at 07:11

## 2023-05-05 RX ADMIN — BUDESONIDE 500 MCG: 0.5 INHALANT RESPIRATORY (INHALATION) at 07:11

## 2023-05-05 RX ADMIN — ENOXAPARIN SODIUM 40 MG: 100 INJECTION SUBCUTANEOUS at 09:02

## 2023-05-05 RX ADMIN — PANTOPRAZOLE SODIUM 40 MG: 40 INJECTION, POWDER, FOR SOLUTION INTRAVENOUS at 09:02

## 2023-05-05 RX ADMIN — NYSTATIN 500000 UNITS: 100000 SUSPENSION ORAL at 09:02

## 2023-05-05 RX ADMIN — CHOLECALCIFEROL TAB 125 MCG (5000 UNIT) 5000 UNITS: 125 TAB at 09:02

## 2023-05-05 RX ADMIN — CYANOCOBALAMIN TAB 1000 MCG 1000 MCG: 1000 TAB at 09:02

## 2023-05-05 RX ADMIN — SODIUM CHLORIDE, PRESERVATIVE FREE 10 ML: 5 INJECTION INTRAVENOUS at 09:03

## 2023-05-05 RX ADMIN — PAROXETINE HYDROCHLORIDE 10 MG: 20 TABLET, FILM COATED ORAL at 09:02

## 2023-05-05 RX ADMIN — IPRATROPIUM BROMIDE 0.5 MG: 0.5 SOLUTION RESPIRATORY (INHALATION) at 11:10

## 2023-05-05 RX ADMIN — WATER 40 MG: 1 INJECTION INTRAMUSCULAR; INTRAVENOUS; SUBCUTANEOUS at 05:01

## 2023-05-05 RX ADMIN — CETIRIZINE HYDROCHLORIDE 10 MG: 10 TABLET, FILM COATED ORAL at 09:02

## 2023-05-05 ASSESSMENT — PAIN SCALES - GENERAL
PAINLEVEL_OUTOF10: 0
PAINLEVEL_OUTOF10: 0

## 2023-05-05 NOTE — PROGRESS NOTES
[facility]  Pulmonary, Critical Care, and Sleep Medicine    Name: Bernie Avery MRN: [id number]   : 1970 Hospital: [facility] [place]    Date: 2023  Room: [facility]/[address] [place] [address] Note                                              Consult requesting physician: Dr. Krystle Dennis  Reason for Consult: Asthma exacerbation. IMPRESSION:     Severe persistent asthma with acute exacerbation. Acute respiratory alkalosis. Dyspnea. Patient Active Problem List   Diagnosis Code    Dyspnea R06.00    Active asthma J45.909    Acute respiratory alkalosis E87.3    Tachypnea R06.82    Chronic pain disorder G89.4    Gastroesophageal reflux disease K21.9    Venous insufficiency of both lower extremities I87.2    Severe persistent asthma with acute exacerbation J45.51       Code status: Full Code       RECOMMENDATIONS:     Respiratory: History of allergic rhinitis, asthma admitted with persistent and recurrence of dyspnea, respiratory alkalosis. Recent 3 multiple ER visits and pulmonary office visit. Previously her pulmonary symptoms were well controlled with Singulair 10 mg nightly and albuterol as needed only. Her symptoms started since her  went out of country; who has returned back. I believe anxiety is contributing to her asthma-like symptoms as well. CTA chest 5/3/2023: No PE. Diffuse small airway inflammation. No pneumonia. Echo 2023: LVEF 65%. Normal diastolic function. RVSP 24 mmHg. PVL LE 2023: No DVT. ABG on admission: Respiratory alkalosis. Bronchodilators: Bronchodilators can be changed to outpatient daily daily regimen Trelegy Ellipta 200 mcg 1 puff daily, albuterol HFA 2 puff every 4 hours as needed for shortness of breath, albuterol nebulized every 4 hours as needed for shortness of breath. Patient has all these medications at home. Continue Singulair 10 mg nightly.   Steroids: Change Solu-Medrol to prednisone 40 mg daily and taper by 10 mg

## 2023-05-05 NOTE — PROGRESS NOTES
Hospitalist Progress Note    Patient: Lesia Worthy MRN: [id number]  CSN: [id number]    YOB: 1970  Age: 52 y.o. Sex: female    DOA: 5/3/2023 LOS:  LOS: 2 days          Chief Complaint:          Assessment/Plan        Severe asthma exacerbation, persistent  Bronchitis  Lactic acidosis   Respiratory alkalosis  URENA  Chronic pain disorder     Steroids IV  Abx-rocephin, doxy  Viral panel is negative  CTA chest no PE  Ct abd negative     Nebs routinely  Mucinex  Tussionex PRN bad cough  02 support as needed     Pulmonology on case        Repeat COVID is negative a full respiratory viral panel was done was also negative      Disposition :  Active Hospital Problems    Diagnosis     Severe persistent asthma with acute exacerbation [J45.51]     Dyspnea [R06.00]     Active asthma [J45.909]     Acute respiratory alkalosis [E87.3]     Tachypnea [R06.82]     Venous insufficiency of both lower extremities [I87.2]     Chronic pain disorder [G89.4]        Subjective:        Review of systems:    Constitutional: denies fevers, chills, myalgias  Respiratory: denies SOB, cough  Cardiovascular: denies chest pain, palpitations  Gastrointestinal: denies nausea, vomiting, diarrhea      Vital signs/Intake and Output:  Visit Vitals  BP (!) 133/58   Pulse 70   Temp 97.6 °F (36.4 °C) (Oral)   Resp 17   Ht 5' 5\" (1.651 m)   Wt 180 lb (81.6 kg)   SpO2 96%   BMI 29.95 kg/m²     Current Shift:  No intake/output data recorded.   Last three shifts:  05/03 1901 - 05/05 0700  In: 700 [P.O.:600]  Out: 2200 [Urine:2200]    Exam:    General: Well developed, alert, NAD, OX3  Head/Neck: NCAT, supple, No masses, No lymphadenopathy  CVS:Regular rate and rhythm, no M/R/G, S1/S2 heard, no thrill  Lungs:Clear to auscultation bilaterally, no wheezes, rhonchi, or rales  Abdomen: Soft, Nontender, No distention, Normal Bowel sounds, No hepatomegaly  Extremities: No C/C/E, pulses palpable 2+  Skin:normal texture and turgor, no rashes, no

## 2023-05-05 NOTE — PROGRESS NOTES
Physician Progress Note      Cheikh Gomez  [facility] #:                  [id number]  :                       1970  ADMIT DATE:       5/3/2023 3:50 PM  DISCH DATE:  RESPONDING  PROVIDER #:        Yaritza Padilla MD          QUERY TEXT:    Pt admitted with Severe asthma exacerbation, with persistent bronchitis. Pt   noted to have worsening SOB, worsening dyspnea, Respiratory alkalosis. If   possible, please document in the progress notes and discharge summary if you   are evaluating and/or treating any of the following:     The medical record reflects the following:    Risk Factors: asthma, bronchitis    Clinical Indicators:  > 23 17:59  HCO3, Mixed: 17.0 (L)  DEVICE: ROOM AIR  Site: LEFT RADIAL  POC Matthew's Test: Positive  pH, Arterial, POC: 7.65 (HH)  pCO2, Arterial, POC: 15.4 (L)  pO2, Arterial, POC: 116 (H)  SO2c, Arterial, POC: 99.4 (H)  > RR 28  > SpO2 89  > Cough and SOB with minimal activity  > Admitted with persistent and recurrence of dyspnea, respiratory alkalosis    Treatment: Receiving ABG continue BiPAP, IV Solu-Medrol Brovana and Pulmicort   empiric antibiotics in the form of Rocephin and doxycycline  FEN  Options provided:  -- Acute respiratory failure with hypoxia  -- Acute respiratory failure with hypercapnia  -- Acute respiratory failure with hypoxia and hypercapnia  -- Chronic respiratory failure with hypoxia  -- Chronic respiratory failure with hypercapnia  -- Chronic respiratory failure with hypoxia and hypercapnia  -- Acute on chronic respiratory failure with hypoxia  -- Acute on chronic respiratory failure with hypercapnia  -- Acute on chronic respiratory failure with hypoxia and hypercapnia  -- Other - I will add my own diagnosis  -- Disagree - Not applicable / Not valid  -- Disagree - Clinically unable to determine / Unknown  -- Refer to Clinical Documentation Reviewer    PROVIDER RESPONSE TEXT:    This patient is in acute respiratory failure with

## 2023-05-05 NOTE — PROGRESS NOTES
Patient ambulated in hallways x 2 without sx of SOB, only dry cough. O2 sats maintained at 96-98% on room air, and when coughing dropped down to 94%. Lungs clear, but diminished at bases right greater than left.

## 2023-05-06 NOTE — DISCHARGE SUMMARY
[address]    Name:  Whitley Fabian  MR#:   [id number]  :  1970  ACCOUNT #:  [de-identified]  ADMIT DATE:  2023  DISCHARGE DATE:  2023    DISCHARGE DIAGNOSES:  1. Severe persistent asthma with acute exacerbation. 2.  Chronic asthma. 3.  Dyspnea on exertion. 4.  Anxiety. HOSPITAL SUMMARY:  This is a 52-year-old female admitted to the Hospitalist Service for persistent dyspnea and worsening dyspnea on exertion. Over the last few weeks, she has had approximately three ER visits. She was receiving a very good course of treatment for her asthma from her primary care doctor's office and she has subsequently been to the emergency room at [facility] where a CTA was done. There was no pneumonia or pulmonary embolism noted and again had been discharged on antibiotics and steroids and she has therefore followed up with her pulmonologist after that and without notable improvement in her symptoms, she was advised to come to the emergency room again. On this ER visit before admission, she was found to be cachectic with severe respiratory alkalosis. A repeat CTA was ordered and showed bronchial inflammation. Due to the worsening alkalosis, she was admitted initially to the intensive care unit because of concern about the severity of her shortness of breath she felt. Metabolic panel outside of mild hyperchloremia has been unremarkable. Her LFTs are within normal range. Lactic acid was elevated 3.8 at the highest and her white blood cell count normal on admission, but now elevated due to the steroids at 19.5. Her hemoglobin is 11.7. Rapid influenza A and B were negative. COVID test was negative. She had a full viral panel that came back negative, and she had a CT abdomen and pelvis that showed no acute pathology in the abdomen or pelvis and a CT angiogram chest again with no evidence for pulmonary embolism.   There was diffuse small airways inflammation, but no

## 2023-11-02 NOTE — ANESTHESIA POSTPROCEDURE EVALUATION
Done, may inform pt   Procedure(s):  ESOPHAGOGASTRODUODENOSCOPY (EGD) WITH MAC; polypectomy  COLONOSCOPY WITH MAC. MAC    <[email or web address]>    INITIAL Post-op Vital signs:   Vitals Value Taken Time   /67 03/04/22 0920   Temp 36.6 °C (97.9 °F) 03/04/22 0907   Pulse 54 03/04/22 0920   Resp 16 03/04/22 0920   SpO2 97 % 03/04/22 0920     Post-Anesthesia Evaluation and Assessment    Cardiovascular Function/Vital Signs  Visit Vitals  /67   Pulse (!) 54   Temp 36.6 °C (97.9 °F)   Resp 16   Ht 5' 5\" (1.651 m)   Wt 74 kg (163 lb 1.6 oz)   SpO2 97%   Breastfeeding No   BMI 27.14 kg/m²       Patient is status post Procedure(s):  ESOPHAGOGASTRODUODENOSCOPY (EGD) WITH MAC; polypectomy  COLONOSCOPY WITH MAC. Nausea/Vomiting: Controlled. Postoperative hydration reviewed and adequate. Pain:  Pain Scale 1: Numeric (0 - 10) (03/04/22 0920)  Pain Intensity 1: 0 (03/04/22 0920)   Managed. Neurological Status: At baseline. Mental Status and Level of Consciousness: Baseline and appropriate for discharge. Pulmonary Status:   O2 Device: None (Room air) (03/04/22 0920)   Adequate oxygenation and airway patent. Complications related to anesthesia: None    Post-anesthesia assessment completed. No concerns. Patient has met all discharge requirements.     Signed By: Merary Sutton CRNA    March 4, 2022

## (undated) DEVICE — AIRLIFE™ NASAL OXYGEN CANNULA CURVED, FLARED TIP WITH 14 FOOT (4.3 M) CRUSH-RESISTANT TUBING, OVER-THE-EAR STYLE: Brand: AIRLIFE™

## (undated) DEVICE — BRUSH CYTO L240CM DIA2.3MM GI COLONOSCOPY 3 RNG HNDL RADPQ

## (undated) DEVICE — SNARE POLYP SM W13MMXL240CM SHTH DIA2.4MM OVL FLX DISP

## (undated) DEVICE — MINOR: Brand: MEDLINE INDUSTRIES, INC.

## (undated) DEVICE — MOUTHPIECE ENDOSCP 20X27MM --

## (undated) DEVICE — GLOVE SURG SZ 7 L12IN FNGR THK79MIL GRN LTX FREE

## (undated) DEVICE — EJECTOR SALIVA 6 IN FLX CLR

## (undated) DEVICE — SET ADMIN 16ML TBNG L100IN 2 Y INJ SITE IV PIGGY BK DISP

## (undated) DEVICE — SPONGE GZ W4XL4IN COT 12 PLY TYP VII WVN C FLD DSGN

## (undated) DEVICE — ALL PURPOSE SPONGES,NON-WOVEN, 4 PLY: Brand: CURITY

## (undated) DEVICE — SHEAR RMFG HARMONIC 5MMX23CM -- LAWSON OEM ITEM 322218

## (undated) DEVICE — GARMENT,MEDLINE,DVT,INT,CALF,MED, GEN2: Brand: MEDLINE

## (undated) DEVICE — SINGLE PORT MANIFOLD: Brand: NEPTUNE 2

## (undated) DEVICE — CATH IV SAFE STR 22GX1IN BLU -- PROTECTIV PLUS

## (undated) DEVICE — SOL IRRIGATION INJ NACL 0.9% 500ML BTL

## (undated) DEVICE — MAJ-1414 SINGLE USE ADPATER BIOPSY VALV: Brand: SINGLE USE ADAPTOR BIOPSY VALVE

## (undated) DEVICE — TRAP SPEC COLL POLYP POLYSTYR --

## (undated) DEVICE — ENDO CARRY-ON PROCEDURE KIT INCLUDES ENZYMATIC SPONGE, GAUZE, BIOHAZARD LABEL, TRAY, LUBRICANT, DIRTY SCOPE LABEL, WATER LABEL, TRAY, DRAWSTRING PAD, AND DEFENDO 4-PIECE KIT.: Brand: ENDO CARRY-ON PROCEDURE KIT

## (undated) DEVICE — SUT VCRL + 2-0 27IN CT2 UD -- 36/BX

## (undated) DEVICE — SYR 3ML LL TIP 1/10ML GRAD --

## (undated) DEVICE — MASTISOL ADHESIVE LIQ 2/3ML

## (undated) DEVICE — SPONGE HEMOSTAT CELLULS 4X8IN -- SURGICEL

## (undated) DEVICE — GLOVE SURG SZ 65 THK91MIL LTX FREE SYN POLYISOPRENE

## (undated) DEVICE — SURGIFOAM SPNG SZ 100

## (undated) DEVICE — FORCEPS BX CAP 240CM L RAD JAW 4

## (undated) DEVICE — TRNQT TEXT 1X18IN BLU LF DISP -- CONVERT TO ITEM 362165

## (undated) DEVICE — NDL PRT INJ NSAF BLNT 18GX1.5 --

## (undated) DEVICE — PAD,ABDOMINAL,5"X9",ST,LF,25/BX: Brand: MEDLINE INDUSTRIES, INC.

## (undated) DEVICE — KENDALL RADIOLUCENT FOAM MONITORING ELECTRODE RECTANGULAR SHAPE: Brand: KENDALL

## (undated) DEVICE — SYR 50ML SLIP TIP NSAF LF STRL --

## (undated) DEVICE — 4-PORT MANIFOLD: Brand: NEPTUNE 2

## (undated) DEVICE — Device

## (undated) DEVICE — PREMIUM WET SKIN PREP TRAY: Brand: MEDLINE INDUSTRIES, INC.